# Patient Record
Sex: FEMALE | Race: WHITE | NOT HISPANIC OR LATINO | Employment: OTHER | URBAN - METROPOLITAN AREA
[De-identification: names, ages, dates, MRNs, and addresses within clinical notes are randomized per-mention and may not be internally consistent; named-entity substitution may affect disease eponyms.]

---

## 2017-01-04 ENCOUNTER — HOSPITAL ENCOUNTER (OUTPATIENT)
Dept: MAMMOGRAPHY | Facility: HOSPITAL | Age: 62
Discharge: HOME | End: 2017-01-04
Admitting: INTERNAL MEDICINE

## 2017-01-04 DIAGNOSIS — Z12.31 VISIT FOR SCREENING MAMMOGRAM: ICD-10-CM

## 2017-01-04 PROCEDURE — 77063 BREAST TOMOSYNTHESIS BI: CPT

## 2017-01-04 PROCEDURE — 77063 BREAST TOMOSYNTHESIS BI: CPT | Performed by: RADIOLOGY

## 2017-01-04 PROCEDURE — 77067 SCR MAMMO BI INCL CAD: CPT | Performed by: RADIOLOGY

## 2017-01-04 PROCEDURE — G0202 SCR MAMMO BI INCL CAD: HCPCS

## 2017-01-20 ENCOUNTER — HOSPITAL ENCOUNTER (OUTPATIENT)
Dept: MAMMOGRAPHY | Facility: HOSPITAL | Age: 62
Discharge: HOME | End: 2017-01-20
Admitting: INTERNAL MEDICINE

## 2017-01-20 DIAGNOSIS — R92.8 ABNORMAL MAMMOGRAM: ICD-10-CM

## 2017-01-20 PROCEDURE — G0279 TOMOSYNTHESIS, MAMMO: HCPCS

## 2017-01-20 PROCEDURE — G0206 DX MAMMO INCL CAD UNI: HCPCS

## 2017-01-20 PROCEDURE — 77061 BREAST TOMOSYNTHESIS UNI: CPT | Performed by: RADIOLOGY

## 2017-01-20 PROCEDURE — 77065 DX MAMMO INCL CAD UNI: CPT | Performed by: RADIOLOGY

## 2017-12-14 ENCOUNTER — TRANSCRIBE ORDERS (OUTPATIENT)
Dept: ADMINISTRATIVE | Facility: HOSPITAL | Age: 62
End: 2017-12-14

## 2017-12-14 DIAGNOSIS — Z12.31 VISIT FOR SCREENING MAMMOGRAM: Primary | ICD-10-CM

## 2018-01-17 ENCOUNTER — HOSPITAL ENCOUNTER (OUTPATIENT)
Dept: MAMMOGRAPHY | Facility: HOSPITAL | Age: 63
Discharge: HOME | End: 2018-01-17
Admitting: INTERNAL MEDICINE

## 2018-01-17 DIAGNOSIS — Z12.31 VISIT FOR SCREENING MAMMOGRAM: ICD-10-CM

## 2018-01-17 PROCEDURE — 77063 BREAST TOMOSYNTHESIS BI: CPT

## 2018-01-17 PROCEDURE — 77067 SCR MAMMO BI INCL CAD: CPT | Performed by: RADIOLOGY

## 2018-01-17 PROCEDURE — 77067 SCR MAMMO BI INCL CAD: CPT

## 2018-01-17 PROCEDURE — 77063 BREAST TOMOSYNTHESIS BI: CPT | Performed by: RADIOLOGY

## 2018-12-12 ENCOUNTER — TRANSCRIBE ORDERS (OUTPATIENT)
Dept: ADMINISTRATIVE | Facility: HOSPITAL | Age: 63
End: 2018-12-12

## 2018-12-12 DIAGNOSIS — Z12.31 VISIT FOR SCREENING MAMMOGRAM: Primary | ICD-10-CM

## 2019-02-04 ENCOUNTER — HOSPITAL ENCOUNTER (OUTPATIENT)
Dept: MAMMOGRAPHY | Facility: HOSPITAL | Age: 64
Discharge: HOME | End: 2019-02-04
Admitting: INTERNAL MEDICINE

## 2019-02-04 DIAGNOSIS — Z12.31 VISIT FOR SCREENING MAMMOGRAM: ICD-10-CM

## 2019-02-04 PROCEDURE — 77063 BREAST TOMOSYNTHESIS BI: CPT | Performed by: RADIOLOGY

## 2019-02-04 PROCEDURE — 77067 SCR MAMMO BI INCL CAD: CPT

## 2019-02-04 PROCEDURE — 77063 BREAST TOMOSYNTHESIS BI: CPT

## 2019-02-04 PROCEDURE — 77067 SCR MAMMO BI INCL CAD: CPT | Performed by: RADIOLOGY

## 2020-01-06 ENCOUNTER — TRANSCRIBE ORDERS (OUTPATIENT)
Dept: ADMINISTRATIVE | Facility: HOSPITAL | Age: 65
End: 2020-01-06

## 2020-01-06 DIAGNOSIS — Z12.31 VISIT FOR SCREENING MAMMOGRAM: Primary | ICD-10-CM

## 2020-03-04 ENCOUNTER — HOSPITAL ENCOUNTER (OUTPATIENT)
Dept: MAMMOGRAPHY | Facility: HOSPITAL | Age: 65
Discharge: HOME | End: 2020-03-04
Admitting: INTERNAL MEDICINE

## 2020-03-04 DIAGNOSIS — Z12.31 VISIT FOR SCREENING MAMMOGRAM: ICD-10-CM

## 2020-03-04 PROCEDURE — 77063 BREAST TOMOSYNTHESIS BI: CPT | Performed by: RADIOLOGY

## 2020-03-04 PROCEDURE — 77063 BREAST TOMOSYNTHESIS BI: CPT

## 2020-03-04 PROCEDURE — 77067 SCR MAMMO BI INCL CAD: CPT | Performed by: RADIOLOGY

## 2020-03-04 PROCEDURE — 77067 SCR MAMMO BI INCL CAD: CPT

## 2020-03-12 ENCOUNTER — HOSPITAL ENCOUNTER (OUTPATIENT)
Dept: MAMMOGRAPHY | Facility: HOSPITAL | Age: 65
Discharge: HOME | End: 2020-03-12

## 2020-03-12 DIAGNOSIS — Z13.9 VISIT FOR SCREENING: ICD-10-CM

## 2021-02-05 ENCOUNTER — TRANSCRIBE ORDERS (OUTPATIENT)
Dept: ADMINISTRATIVE | Facility: HOSPITAL | Age: 66
End: 2021-02-05

## 2021-02-05 DIAGNOSIS — Z12.31 VISIT FOR SCREENING MAMMOGRAM: Primary | ICD-10-CM

## 2021-03-19 ENCOUNTER — APPOINTMENT (OUTPATIENT)
Dept: MAMMOGRAPHY | Facility: HOSPITAL | Age: 66
End: 2021-03-19

## 2021-04-30 ENCOUNTER — APPOINTMENT (OUTPATIENT)
Dept: MAMMOGRAPHY | Facility: HOSPITAL | Age: 66
End: 2021-04-30

## 2022-01-20 ENCOUNTER — TRANSCRIBE ORDERS (OUTPATIENT)
Dept: ADMINISTRATIVE | Facility: HOSPITAL | Age: 67
End: 2022-01-20

## 2022-01-20 DIAGNOSIS — Z12.31 VISIT FOR SCREENING MAMMOGRAM: Primary | ICD-10-CM

## 2022-03-07 ENCOUNTER — HOSPITAL ENCOUNTER (OUTPATIENT)
Dept: MAMMOGRAPHY | Facility: HOSPITAL | Age: 67
Discharge: HOME | End: 2022-03-07
Admitting: INTERNAL MEDICINE

## 2022-03-07 DIAGNOSIS — Z12.31 VISIT FOR SCREENING MAMMOGRAM: ICD-10-CM

## 2022-03-07 PROCEDURE — 77063 BREAST TOMOSYNTHESIS BI: CPT | Performed by: RADIOLOGY

## 2022-03-07 PROCEDURE — 77067 SCR MAMMO BI INCL CAD: CPT

## 2022-03-07 PROCEDURE — 77067 SCR MAMMO BI INCL CAD: CPT | Performed by: RADIOLOGY

## 2022-03-07 PROCEDURE — 77063 BREAST TOMOSYNTHESIS BI: CPT

## 2023-01-30 ENCOUNTER — TRANSCRIBE ORDERS (OUTPATIENT)
Dept: ADMINISTRATIVE | Facility: HOSPITAL | Age: 68
End: 2023-01-30
Payer: MEDICARE

## 2023-01-30 DIAGNOSIS — Z12.31 VISIT FOR SCREENING MAMMOGRAM: Primary | ICD-10-CM

## 2023-03-14 ENCOUNTER — HOSPITAL ENCOUNTER (OUTPATIENT)
Dept: MAMMOGRAPHY | Facility: HOSPITAL | Age: 68
Discharge: HOME | End: 2023-03-14
Admitting: INTERNAL MEDICINE
Payer: MEDICARE

## 2023-03-14 DIAGNOSIS — Z12.31 VISIT FOR SCREENING MAMMOGRAM: ICD-10-CM

## 2023-03-14 PROCEDURE — 77063 BREAST TOMOSYNTHESIS BI: CPT | Performed by: RADIOLOGY

## 2023-03-14 PROCEDURE — 77063 BREAST TOMOSYNTHESIS BI: CPT

## 2023-03-14 PROCEDURE — 77067 SCR MAMMO BI INCL CAD: CPT

## 2023-03-14 PROCEDURE — 77067 SCR MAMMO BI INCL CAD: CPT | Performed by: RADIOLOGY

## 2024-02-14 ENCOUNTER — TRANSCRIBE ORDERS (OUTPATIENT)
Dept: ADMINISTRATIVE | Facility: HOSPITAL | Age: 69
End: 2024-02-14
Payer: MEDICARE

## 2024-02-14 DIAGNOSIS — Z12.31 BREAST CANCER SCREENING BY MAMMOGRAM: Primary | ICD-10-CM

## 2024-03-21 ENCOUNTER — HOSPITAL ENCOUNTER (OUTPATIENT)
Dept: MAMMOGRAPHY | Facility: HOSPITAL | Age: 69
Discharge: HOME | End: 2024-03-21
Admitting: INTERNAL MEDICINE
Payer: MEDICARE

## 2024-03-21 DIAGNOSIS — Z12.31 BREAST CANCER SCREENING BY MAMMOGRAM: ICD-10-CM

## 2024-03-21 PROCEDURE — 77063 BREAST TOMOSYNTHESIS BI: CPT

## 2024-03-21 PROCEDURE — 77067 SCR MAMMO BI INCL CAD: CPT

## 2024-07-30 ENCOUNTER — HOSPITAL ENCOUNTER (INPATIENT)
Facility: HOSPITAL | Age: 69
LOS: 3 days | Discharge: HOME | End: 2024-08-03
Attending: FAMILY MEDICINE | Admitting: INTERNAL MEDICINE
Payer: MEDICARE

## 2024-07-30 DIAGNOSIS — N94.89 ADNEXAL MASS: ICD-10-CM

## 2024-07-30 DIAGNOSIS — R19.00 PELVIC MASS IN FEMALE: Primary | ICD-10-CM

## 2024-07-30 PROBLEM — E78.2 MIXED HYPERLIPIDEMIA: Status: ACTIVE | Noted: 2024-07-30

## 2024-07-30 PROBLEM — N20.0 LEFT RENAL STONE: Status: ACTIVE | Noted: 2024-07-30

## 2024-07-30 PROBLEM — I10 ESSENTIAL HYPERTENSION: Status: ACTIVE | Noted: 2024-07-30

## 2024-07-30 LAB
ALBUMIN SERPL-MCNC: 3.7 G/DL (ref 3.5–5.2)
ALBUMIN/GLOB SERPL: 1.1 G/DL
ALP SERPL-CCNC: 100 U/L (ref 39–117)
ALT SERPL W P-5'-P-CCNC: 21 U/L (ref 1–33)
ANION GAP SERPL CALCULATED.3IONS-SCNC: 11 MMOL/L (ref 5–15)
AST SERPL-CCNC: 29 U/L (ref 1–32)
BASOPHILS # BLD AUTO: 0.04 10*3/MM3 (ref 0–0.2)
BASOPHILS NFR BLD AUTO: 0.3 % (ref 0–1.5)
BILIRUB SERPL-MCNC: 0.8 MG/DL (ref 0–1.2)
BUN SERPL-MCNC: 8 MG/DL (ref 8–23)
BUN/CREAT SERPL: 9.4 (ref 7–25)
CALCIUM SPEC-SCNC: 9.1 MG/DL (ref 8.6–10.5)
CHLORIDE SERPL-SCNC: 105 MMOL/L (ref 98–107)
CO2 SERPL-SCNC: 24 MMOL/L (ref 22–29)
CREAT SERPL-MCNC: 0.85 MG/DL (ref 0.57–1)
DEPRECATED RDW RBC AUTO: 43.7 FL (ref 37–54)
EGFRCR SERPLBLD CKD-EPI 2021: 73.8 ML/MIN/1.73
EOSINOPHIL # BLD AUTO: 0.03 10*3/MM3 (ref 0–0.4)
EOSINOPHIL NFR BLD AUTO: 0.2 % (ref 0.3–6.2)
ERYTHROCYTE [DISTWIDTH] IN BLOOD BY AUTOMATED COUNT: 14.2 % (ref 12.3–15.4)
GLOBULIN UR ELPH-MCNC: 3.5 GM/DL
GLUCOSE SERPL-MCNC: 100 MG/DL (ref 65–99)
HCT VFR BLD AUTO: 39.6 % (ref 34–46.6)
HGB BLD-MCNC: 12.8 G/DL (ref 12–15.9)
IMM GRANULOCYTES # BLD AUTO: 0.03 10*3/MM3 (ref 0–0.05)
IMM GRANULOCYTES NFR BLD AUTO: 0.2 % (ref 0–0.5)
LYMPHOCYTES # BLD AUTO: 2.24 10*3/MM3 (ref 0.7–3.1)
LYMPHOCYTES NFR BLD AUTO: 15.7 % (ref 19.6–45.3)
MCH RBC QN AUTO: 27.4 PG (ref 26.6–33)
MCHC RBC AUTO-ENTMCNC: 32.3 G/DL (ref 31.5–35.7)
MCV RBC AUTO: 84.8 FL (ref 79–97)
MONOCYTES # BLD AUTO: 1.19 10*3/MM3 (ref 0.1–0.9)
MONOCYTES NFR BLD AUTO: 8.4 % (ref 5–12)
NEUTROPHILS NFR BLD AUTO: 10.7 10*3/MM3 (ref 1.7–7)
NEUTROPHILS NFR BLD AUTO: 75.2 % (ref 42.7–76)
NRBC BLD AUTO-RTO: 0 /100 WBC (ref 0–0.2)
PLATELET # BLD AUTO: 269 10*3/MM3 (ref 140–450)
PMV BLD AUTO: 10.7 FL (ref 6–12)
POTASSIUM SERPL-SCNC: 4.4 MMOL/L (ref 3.5–5.2)
PROT SERPL-MCNC: 7.2 G/DL (ref 6–8.5)
RBC # BLD AUTO: 4.67 10*6/MM3 (ref 3.77–5.28)
SODIUM SERPL-SCNC: 140 MMOL/L (ref 136–145)
WBC NRBC COR # BLD AUTO: 14.23 10*3/MM3 (ref 3.4–10.8)

## 2024-07-30 PROCEDURE — G0378 HOSPITAL OBSERVATION PER HR: HCPCS

## 2024-07-30 PROCEDURE — 93005 ELECTROCARDIOGRAM TRACING: CPT | Performed by: PHYSICIAN ASSISTANT

## 2024-07-30 PROCEDURE — 25810000003 SODIUM CHLORIDE 0.9 % SOLUTION: Performed by: PHYSICIAN ASSISTANT

## 2024-07-30 PROCEDURE — 80053 COMPREHEN METABOLIC PANEL: CPT | Performed by: PHYSICIAN ASSISTANT

## 2024-07-30 PROCEDURE — 85025 COMPLETE CBC W/AUTO DIFF WBC: CPT | Performed by: PHYSICIAN ASSISTANT

## 2024-07-30 PROCEDURE — 99222 1ST HOSP IP/OBS MODERATE 55: CPT | Performed by: FAMILY MEDICINE

## 2024-07-30 PROCEDURE — 93010 ELECTROCARDIOGRAM REPORT: CPT | Performed by: INTERNAL MEDICINE

## 2024-07-30 RX ORDER — VITAMIN A, ASCORBIC ACID, CHOLECALCIFEROL, ALPHA-TOCOPHEROL ACETATE, THIAMINE HYDROCHLORIDE, RIBOFLAVIN 5-PHOSPHATE SODIUM, NIACINAMIDE, PYRIDOXINE HYDROCHLORIDE, FERROUS SULFATE AND SODIUM FLUORIDE 1500; 35; 400; 5; .5; .6; 8; .4; 10; .25 [IU]/ML; MG/ML; [IU]/ML; [IU]/ML; MG/ML; MG/ML; MG/ML; MG/ML; MG/ML; MG/ML
1 LIQUID ORAL DAILY
COMMUNITY

## 2024-07-30 RX ORDER — SODIUM CHLORIDE 0.9 % (FLUSH) 0.9 %
10 SYRINGE (ML) INJECTION AS NEEDED
Status: DISCONTINUED | OUTPATIENT
Start: 2024-07-30 | End: 2024-08-03 | Stop reason: HOSPADM

## 2024-07-30 RX ORDER — SODIUM CHLORIDE 0.9 % (FLUSH) 0.9 %
10 SYRINGE (ML) INJECTION EVERY 12 HOURS SCHEDULED
Status: DISCONTINUED | OUTPATIENT
Start: 2024-07-30 | End: 2024-08-03 | Stop reason: HOSPADM

## 2024-07-30 RX ORDER — ACETAMINOPHEN 650 MG/1
650 SUPPOSITORY RECTAL EVERY 4 HOURS PRN
Status: DISCONTINUED | OUTPATIENT
Start: 2024-07-30 | End: 2024-08-03 | Stop reason: HOSPADM

## 2024-07-30 RX ORDER — ROSUVASTATIN CALCIUM 20 MG/1
20 TABLET, COATED ORAL DAILY
Status: DISCONTINUED | OUTPATIENT
Start: 2024-07-31 | End: 2024-07-31

## 2024-07-30 RX ORDER — MORPHINE SULFATE 2 MG/ML
2 INJECTION, SOLUTION INTRAMUSCULAR; INTRAVENOUS EVERY 4 HOURS PRN
Status: DISCONTINUED | OUTPATIENT
Start: 2024-07-30 | End: 2024-07-31

## 2024-07-30 RX ORDER — ASPIRIN 81 MG/1
81 TABLET, CHEWABLE ORAL DAILY
COMMUNITY

## 2024-07-30 RX ORDER — ONDANSETRON 4 MG/1
4 TABLET, ORALLY DISINTEGRATING ORAL EVERY 6 HOURS PRN
Status: DISCONTINUED | OUTPATIENT
Start: 2024-07-30 | End: 2024-07-31

## 2024-07-30 RX ORDER — ACETAMINOPHEN 325 MG/1
650 TABLET ORAL EVERY 4 HOURS PRN
Status: DISCONTINUED | OUTPATIENT
Start: 2024-07-30 | End: 2024-08-03 | Stop reason: HOSPADM

## 2024-07-30 RX ORDER — ERGOCALCIFEROL (VITAMIN D2) 10 MCG
500 TABLET ORAL DAILY
COMMUNITY

## 2024-07-30 RX ORDER — LISINOPRIL 10 MG/1
10 TABLET ORAL DAILY
COMMUNITY

## 2024-07-30 RX ORDER — ONDANSETRON 2 MG/ML
4 INJECTION INTRAMUSCULAR; INTRAVENOUS EVERY 6 HOURS PRN
Status: DISCONTINUED | OUTPATIENT
Start: 2024-07-30 | End: 2024-07-31

## 2024-07-30 RX ORDER — NITROGLYCERIN 0.4 MG/1
0.4 TABLET SUBLINGUAL
Status: DISCONTINUED | OUTPATIENT
Start: 2024-07-30 | End: 2024-08-03 | Stop reason: HOSPADM

## 2024-07-30 RX ORDER — ROSUVASTATIN CALCIUM 20 MG/1
20 TABLET, COATED ORAL DAILY
COMMUNITY

## 2024-07-30 RX ORDER — LISINOPRIL 10 MG/1
10 TABLET ORAL DAILY
Status: DISCONTINUED | OUTPATIENT
Start: 2024-07-31 | End: 2024-08-03 | Stop reason: HOSPADM

## 2024-07-30 RX ORDER — SODIUM CHLORIDE 9 MG/ML
75 INJECTION, SOLUTION INTRAVENOUS CONTINUOUS
Status: DISCONTINUED | OUTPATIENT
Start: 2024-07-30 | End: 2024-08-03 | Stop reason: HOSPADM

## 2024-07-30 RX ORDER — SODIUM CHLORIDE 9 MG/ML
40 INJECTION, SOLUTION INTRAVENOUS AS NEEDED
Status: DISCONTINUED | OUTPATIENT
Start: 2024-07-30 | End: 2024-08-03 | Stop reason: HOSPADM

## 2024-07-30 RX ORDER — ACETAMINOPHEN 160 MG/5ML
650 SOLUTION ORAL EVERY 4 HOURS PRN
Status: DISCONTINUED | OUTPATIENT
Start: 2024-07-30 | End: 2024-08-03 | Stop reason: HOSPADM

## 2024-07-30 RX ORDER — ASPIRIN 81 MG/1
81 TABLET, CHEWABLE ORAL DAILY
Status: DISCONTINUED | OUTPATIENT
Start: 2024-07-31 | End: 2024-08-03 | Stop reason: HOSPADM

## 2024-07-30 RX ADMIN — SODIUM CHLORIDE 75 ML/HR: 9 INJECTION, SOLUTION INTRAVENOUS at 23:05

## 2024-07-31 ENCOUNTER — ANESTHESIA (OUTPATIENT)
Dept: PERIOP | Facility: HOSPITAL | Age: 69
End: 2024-07-31
Payer: MEDICARE

## 2024-07-31 ENCOUNTER — ANESTHESIA EVENT (OUTPATIENT)
Dept: PERIOP | Facility: HOSPITAL | Age: 69
End: 2024-07-31
Payer: MEDICARE

## 2024-07-31 ENCOUNTER — ANESTHESIA EVENT CONVERTED (OUTPATIENT)
Dept: ANESTHESIOLOGY | Facility: HOSPITAL | Age: 69
End: 2024-07-31
Payer: MEDICARE

## 2024-07-31 PROBLEM — R19.00 PELVIC MASS IN FEMALE: Status: ACTIVE | Noted: 2024-07-30

## 2024-07-31 LAB
ABO GROUP BLD: NORMAL
ABO GROUP BLD: NORMAL
ANION GAP SERPL CALCULATED.3IONS-SCNC: 9 MMOL/L (ref 5–15)
BASOPHILS # BLD AUTO: 0.05 10*3/MM3 (ref 0–0.2)
BASOPHILS NFR BLD AUTO: 0.4 % (ref 0–1.5)
BLD GP AB SCN SERPL QL: NEGATIVE
BUN SERPL-MCNC: 10 MG/DL (ref 8–23)
BUN/CREAT SERPL: 12.5 (ref 7–25)
CALCIUM SPEC-SCNC: 8.3 MG/DL (ref 8.6–10.5)
CHLORIDE SERPL-SCNC: 105 MMOL/L (ref 98–107)
CO2 SERPL-SCNC: 24 MMOL/L (ref 22–29)
CREAT SERPL-MCNC: 0.8 MG/DL (ref 0.57–1)
DEPRECATED RDW RBC AUTO: 44 FL (ref 37–54)
EGFRCR SERPLBLD CKD-EPI 2021: 79.4 ML/MIN/1.73
EOSINOPHIL # BLD AUTO: 0.06 10*3/MM3 (ref 0–0.4)
EOSINOPHIL NFR BLD AUTO: 0.5 % (ref 0.3–6.2)
ERYTHROCYTE [DISTWIDTH] IN BLOOD BY AUTOMATED COUNT: 14.2 % (ref 12.3–15.4)
GLUCOSE SERPL-MCNC: 135 MG/DL (ref 65–99)
HCT VFR BLD AUTO: 36 % (ref 34–46.6)
HGB BLD-MCNC: 11.5 G/DL (ref 12–15.9)
IMM GRANULOCYTES # BLD AUTO: 0.04 10*3/MM3 (ref 0–0.05)
IMM GRANULOCYTES NFR BLD AUTO: 0.3 % (ref 0–0.5)
LYMPHOCYTES # BLD AUTO: 1.35 10*3/MM3 (ref 0.7–3.1)
LYMPHOCYTES NFR BLD AUTO: 11.6 % (ref 19.6–45.3)
MCH RBC QN AUTO: 27.1 PG (ref 26.6–33)
MCHC RBC AUTO-ENTMCNC: 31.9 G/DL (ref 31.5–35.7)
MCV RBC AUTO: 84.9 FL (ref 79–97)
MONOCYTES # BLD AUTO: 1.01 10*3/MM3 (ref 0.1–0.9)
MONOCYTES NFR BLD AUTO: 8.7 % (ref 5–12)
NEUTROPHILS NFR BLD AUTO: 78.5 % (ref 42.7–76)
NEUTROPHILS NFR BLD AUTO: 9.15 10*3/MM3 (ref 1.7–7)
NRBC BLD AUTO-RTO: 0 /100 WBC (ref 0–0.2)
PLATELET # BLD AUTO: 254 10*3/MM3 (ref 140–450)
PMV BLD AUTO: 10.9 FL (ref 6–12)
POTASSIUM SERPL-SCNC: 4.1 MMOL/L (ref 3.5–5.2)
QT INTERVAL: 370 MS
QTC INTERVAL: 407 MS
RBC # BLD AUTO: 4.24 10*6/MM3 (ref 3.77–5.28)
RH BLD: POSITIVE
RH BLD: POSITIVE
SODIUM SERPL-SCNC: 138 MMOL/L (ref 136–145)
T&S EXPIRATION DATE: NORMAL
WBC NRBC COR # BLD AUTO: 11.66 10*3/MM3 (ref 3.4–10.8)

## 2024-07-31 PROCEDURE — 25010000002 MORPHINE PER 10 MG: Performed by: FAMILY MEDICINE

## 2024-07-31 PROCEDURE — 0UT90ZZ RESECTION OF UTERUS, OPEN APPROACH: ICD-10-PCS | Performed by: OBSTETRICS & GYNECOLOGY

## 2024-07-31 PROCEDURE — 86900 BLOOD TYPING SEROLOGIC ABO: CPT | Performed by: OBSTETRICS & GYNECOLOGY

## 2024-07-31 PROCEDURE — 85025 COMPLETE CBC W/AUTO DIFF WBC: CPT | Performed by: PHYSICIAN ASSISTANT

## 2024-07-31 PROCEDURE — 88307 TISSUE EXAM BY PATHOLOGIST: CPT | Performed by: OBSTETRICS & GYNECOLOGY

## 2024-07-31 PROCEDURE — 0UT70ZZ RESECTION OF BILATERAL FALLOPIAN TUBES, OPEN APPROACH: ICD-10-PCS | Performed by: OBSTETRICS & GYNECOLOGY

## 2024-07-31 PROCEDURE — 25010000002 CEFAZOLIN PER 500 MG: Performed by: OBSTETRICS & GYNECOLOGY

## 2024-07-31 PROCEDURE — 25810000003 LACTATED RINGERS PER 1000 ML: Performed by: OBSTETRICS & GYNECOLOGY

## 2024-07-31 PROCEDURE — 0UT20ZZ RESECTION OF BILATERAL OVARIES, OPEN APPROACH: ICD-10-PCS | Performed by: OBSTETRICS & GYNECOLOGY

## 2024-07-31 PROCEDURE — 25010000002 DEXAMETHASONE SODIUM PHOSPHATE 10 MG/ML SOLUTION: Performed by: NURSE ANESTHETIST, CERTIFIED REGISTERED

## 2024-07-31 PROCEDURE — 25010000002 ONDANSETRON PER 1 MG: Performed by: NURSE ANESTHETIST, CERTIFIED REGISTERED

## 2024-07-31 PROCEDURE — 25010000002 HYDROMORPHONE PER 4 MG: Performed by: NURSE ANESTHETIST, CERTIFIED REGISTERED

## 2024-07-31 PROCEDURE — 99232 SBSQ HOSP IP/OBS MODERATE 35: CPT | Performed by: STUDENT IN AN ORGANIZED HEALTH CARE EDUCATION/TRAINING PROGRAM

## 2024-07-31 PROCEDURE — 86901 BLOOD TYPING SEROLOGIC RH(D): CPT

## 2024-07-31 PROCEDURE — 86900 BLOOD TYPING SEROLOGIC ABO: CPT

## 2024-07-31 PROCEDURE — 25010000002 SUGAMMADEX 200 MG/2ML SOLUTION: Performed by: NURSE ANESTHETIST, CERTIFIED REGISTERED

## 2024-07-31 PROCEDURE — 86901 BLOOD TYPING SEROLOGIC RH(D): CPT | Performed by: OBSTETRICS & GYNECOLOGY

## 2024-07-31 PROCEDURE — 25010000002 BUPIVACAINE (PF) 0.25 % SOLUTION: Performed by: NURSE ANESTHETIST, CERTIFIED REGISTERED

## 2024-07-31 PROCEDURE — 25810000003 LACTATED RINGERS PER 1000 ML: Performed by: ANESTHESIOLOGY

## 2024-07-31 PROCEDURE — 25010000002 PROPOFOL 10 MG/ML EMULSION: Performed by: NURSE ANESTHETIST, CERTIFIED REGISTERED

## 2024-07-31 PROCEDURE — 86850 RBC ANTIBODY SCREEN: CPT | Performed by: OBSTETRICS & GYNECOLOGY

## 2024-07-31 PROCEDURE — 80048 BASIC METABOLIC PNL TOTAL CA: CPT | Performed by: PHYSICIAN ASSISTANT

## 2024-07-31 RX ORDER — ONDANSETRON 4 MG/1
4 TABLET, ORALLY DISINTEGRATING ORAL EVERY 6 HOURS PRN
Status: DISCONTINUED | OUTPATIENT
Start: 2024-07-31 | End: 2024-08-03 | Stop reason: HOSPADM

## 2024-07-31 RX ORDER — BUPIVACAINE HYDROCHLORIDE 2.5 MG/ML
INJECTION, SOLUTION EPIDURAL; INFILTRATION; INTRACAUDAL
Status: COMPLETED | OUTPATIENT
Start: 2024-07-31 | End: 2024-07-31

## 2024-07-31 RX ORDER — PROMETHAZINE HYDROCHLORIDE 12.5 MG/1
12.5 SUPPOSITORY RECTAL EVERY 6 HOURS PRN
Status: DISCONTINUED | OUTPATIENT
Start: 2024-07-31 | End: 2024-08-03 | Stop reason: HOSPADM

## 2024-07-31 RX ORDER — MIDAZOLAM HYDROCHLORIDE 1 MG/ML
0.5 INJECTION INTRAMUSCULAR; INTRAVENOUS
Status: DISCONTINUED | OUTPATIENT
Start: 2024-07-31 | End: 2024-07-31 | Stop reason: HOSPADM

## 2024-07-31 RX ORDER — DROPERIDOL 2.5 MG/ML
0.62 INJECTION, SOLUTION INTRAMUSCULAR; INTRAVENOUS ONCE AS NEEDED
Status: DISCONTINUED | OUTPATIENT
Start: 2024-07-31 | End: 2024-07-31 | Stop reason: HOSPADM

## 2024-07-31 RX ORDER — OXYCODONE HYDROCHLORIDE 10 MG/1
10 TABLET ORAL EVERY 4 HOURS PRN
Status: DISCONTINUED | OUTPATIENT
Start: 2024-07-31 | End: 2024-08-03 | Stop reason: HOSPADM

## 2024-07-31 RX ORDER — NALOXONE HCL 0.4 MG/ML
0.4 VIAL (ML) INJECTION
Status: DISCONTINUED | OUTPATIENT
Start: 2024-07-31 | End: 2024-08-03 | Stop reason: HOSPADM

## 2024-07-31 RX ORDER — SODIUM CHLORIDE, SODIUM LACTATE, POTASSIUM CHLORIDE, CALCIUM CHLORIDE 600; 310; 30; 20 MG/100ML; MG/100ML; MG/100ML; MG/100ML
75 INJECTION, SOLUTION INTRAVENOUS CONTINUOUS
Status: DISCONTINUED | OUTPATIENT
Start: 2024-07-31 | End: 2024-08-03 | Stop reason: HOSPADM

## 2024-07-31 RX ORDER — FAMOTIDINE 10 MG/ML
20 INJECTION, SOLUTION INTRAVENOUS ONCE
Status: CANCELLED | OUTPATIENT
Start: 2024-07-31 | End: 2024-07-31

## 2024-07-31 RX ORDER — SODIUM CHLORIDE 0.9 % (FLUSH) 0.9 %
3 SYRINGE (ML) INJECTION EVERY 12 HOURS SCHEDULED
Status: DISCONTINUED | OUTPATIENT
Start: 2024-07-31 | End: 2024-07-31 | Stop reason: HOSPADM

## 2024-07-31 RX ORDER — ONDANSETRON 2 MG/ML
4 INJECTION INTRAMUSCULAR; INTRAVENOUS ONCE AS NEEDED
Status: DISCONTINUED | OUTPATIENT
Start: 2024-07-31 | End: 2024-07-31 | Stop reason: HOSPADM

## 2024-07-31 RX ORDER — ONDANSETRON 2 MG/ML
INJECTION INTRAMUSCULAR; INTRAVENOUS AS NEEDED
Status: DISCONTINUED | OUTPATIENT
Start: 2024-07-31 | End: 2024-07-31 | Stop reason: SURG

## 2024-07-31 RX ORDER — SODIUM CHLORIDE 9 MG/ML
40 INJECTION, SOLUTION INTRAVENOUS AS NEEDED
Status: CANCELLED | OUTPATIENT
Start: 2024-07-31

## 2024-07-31 RX ORDER — PROPOFOL 10 MG/ML
VIAL (ML) INTRAVENOUS AS NEEDED
Status: DISCONTINUED | OUTPATIENT
Start: 2024-07-31 | End: 2024-07-31 | Stop reason: SURG

## 2024-07-31 RX ORDER — FAMOTIDINE 20 MG/1
20 TABLET, FILM COATED ORAL ONCE
Status: DISCONTINUED | OUTPATIENT
Start: 2024-07-31 | End: 2024-08-03 | Stop reason: HOSPADM

## 2024-07-31 RX ORDER — HYDROMORPHONE HYDROCHLORIDE 2 MG/ML
INJECTION, SOLUTION INTRAMUSCULAR; INTRAVENOUS; SUBCUTANEOUS AS NEEDED
Status: DISCONTINUED | OUTPATIENT
Start: 2024-07-31 | End: 2024-07-31 | Stop reason: SURG

## 2024-07-31 RX ORDER — IPRATROPIUM BROMIDE AND ALBUTEROL SULFATE 2.5; .5 MG/3ML; MG/3ML
3 SOLUTION RESPIRATORY (INHALATION) ONCE AS NEEDED
Status: DISCONTINUED | OUTPATIENT
Start: 2024-07-31 | End: 2024-07-31 | Stop reason: HOSPADM

## 2024-07-31 RX ORDER — ROCURONIUM BROMIDE 10 MG/ML
INJECTION, SOLUTION INTRAVENOUS AS NEEDED
Status: DISCONTINUED | OUTPATIENT
Start: 2024-07-31 | End: 2024-07-31 | Stop reason: SURG

## 2024-07-31 RX ORDER — PROMETHAZINE HYDROCHLORIDE 25 MG/1
25 SUPPOSITORY RECTAL ONCE AS NEEDED
Status: DISCONTINUED | OUTPATIENT
Start: 2024-07-31 | End: 2024-07-31 | Stop reason: HOSPADM

## 2024-07-31 RX ORDER — ACETAMINOPHEN 325 MG/1
650 TABLET ORAL EVERY 6 HOURS SCHEDULED
Status: DISCONTINUED | OUTPATIENT
Start: 2024-07-31 | End: 2024-08-03 | Stop reason: HOSPADM

## 2024-07-31 RX ORDER — OXYCODONE HYDROCHLORIDE 5 MG/1
5 TABLET ORAL EVERY 4 HOURS PRN
Status: DISCONTINUED | OUTPATIENT
Start: 2024-07-31 | End: 2024-08-03 | Stop reason: HOSPADM

## 2024-07-31 RX ORDER — SODIUM CHLORIDE 0.9 % (FLUSH) 0.9 %
3-10 SYRINGE (ML) INJECTION AS NEEDED
Status: DISCONTINUED | OUTPATIENT
Start: 2024-07-31 | End: 2024-07-31 | Stop reason: HOSPADM

## 2024-07-31 RX ORDER — SODIUM CHLORIDE 0.9 % (FLUSH) 0.9 %
10 SYRINGE (ML) INJECTION EVERY 12 HOURS SCHEDULED
Status: DISCONTINUED | OUTPATIENT
Start: 2024-07-31 | End: 2024-07-31 | Stop reason: HOSPADM

## 2024-07-31 RX ORDER — FENTANYL CITRATE 50 UG/ML
50 INJECTION, SOLUTION INTRAMUSCULAR; INTRAVENOUS
Status: DISCONTINUED | OUTPATIENT
Start: 2024-07-31 | End: 2024-07-31 | Stop reason: HOSPADM

## 2024-07-31 RX ORDER — LIDOCAINE HYDROCHLORIDE 10 MG/ML
0.5 INJECTION, SOLUTION EPIDURAL; INFILTRATION; INTRACAUDAL; PERINEURAL ONCE AS NEEDED
Status: DISCONTINUED | OUTPATIENT
Start: 2024-07-31 | End: 2024-07-31 | Stop reason: HOSPADM

## 2024-07-31 RX ORDER — PROMETHAZINE HYDROCHLORIDE 12.5 MG/1
12.5 TABLET ORAL EVERY 6 HOURS PRN
Status: DISCONTINUED | OUTPATIENT
Start: 2024-07-31 | End: 2024-08-03 | Stop reason: HOSPADM

## 2024-07-31 RX ORDER — DEXAMETHASONE SODIUM PHOSPHATE 10 MG/ML
INJECTION, SOLUTION INTRAMUSCULAR; INTRAVENOUS
Status: COMPLETED | OUTPATIENT
Start: 2024-07-31 | End: 2024-07-31

## 2024-07-31 RX ORDER — HYDROMORPHONE HYDROCHLORIDE 2 MG/ML
0.5 INJECTION, SOLUTION INTRAMUSCULAR; INTRAVENOUS; SUBCUTANEOUS
Status: DISCONTINUED | OUTPATIENT
Start: 2024-07-31 | End: 2024-08-03 | Stop reason: HOSPADM

## 2024-07-31 RX ORDER — SODIUM CHLORIDE 9 MG/ML
40 INJECTION, SOLUTION INTRAVENOUS AS NEEDED
Status: DISCONTINUED | OUTPATIENT
Start: 2024-07-31 | End: 2024-07-31 | Stop reason: HOSPADM

## 2024-07-31 RX ORDER — SODIUM CHLORIDE 0.9 % (FLUSH) 0.9 %
10 SYRINGE (ML) INJECTION AS NEEDED
Status: CANCELLED | OUTPATIENT
Start: 2024-07-31

## 2024-07-31 RX ORDER — HYDROMORPHONE HYDROCHLORIDE 1 MG/ML
0.5 INJECTION, SOLUTION INTRAMUSCULAR; INTRAVENOUS; SUBCUTANEOUS
Status: DISCONTINUED | OUTPATIENT
Start: 2024-07-31 | End: 2024-07-31 | Stop reason: HOSPADM

## 2024-07-31 RX ORDER — PROMETHAZINE HYDROCHLORIDE 25 MG/1
25 TABLET ORAL ONCE AS NEEDED
Status: DISCONTINUED | OUTPATIENT
Start: 2024-07-31 | End: 2024-07-31 | Stop reason: HOSPADM

## 2024-07-31 RX ORDER — LIDOCAINE HYDROCHLORIDE 10 MG/ML
INJECTION, SOLUTION EPIDURAL; INFILTRATION; INTRACAUDAL; PERINEURAL AS NEEDED
Status: DISCONTINUED | OUTPATIENT
Start: 2024-07-31 | End: 2024-07-31 | Stop reason: SURG

## 2024-07-31 RX ORDER — IBUPROFEN 600 MG/1
600 TABLET ORAL EVERY 6 HOURS PRN
Status: DISCONTINUED | OUTPATIENT
Start: 2024-07-31 | End: 2024-08-03 | Stop reason: HOSPADM

## 2024-07-31 RX ORDER — HYDRALAZINE HYDROCHLORIDE 20 MG/ML
5 INJECTION INTRAMUSCULAR; INTRAVENOUS
Status: DISCONTINUED | OUTPATIENT
Start: 2024-07-31 | End: 2024-07-31 | Stop reason: HOSPADM

## 2024-07-31 RX ORDER — DROPERIDOL 2.5 MG/ML
0.62 INJECTION, SOLUTION INTRAMUSCULAR; INTRAVENOUS
Status: DISCONTINUED | OUTPATIENT
Start: 2024-07-31 | End: 2024-07-31 | Stop reason: HOSPADM

## 2024-07-31 RX ORDER — CEFAZOLIN SODIUM 1 G/3ML
2 INJECTION, POWDER, FOR SOLUTION INTRAMUSCULAR; INTRAVENOUS
Status: DISCONTINUED | OUTPATIENT
Start: 2024-08-01 | End: 2024-07-31

## 2024-07-31 RX ORDER — MAGNESIUM HYDROXIDE 1200 MG/15ML
LIQUID ORAL AS NEEDED
Status: DISCONTINUED | OUTPATIENT
Start: 2024-07-31 | End: 2024-07-31 | Stop reason: HOSPADM

## 2024-07-31 RX ORDER — NALOXONE HCL 0.4 MG/ML
0.1 VIAL (ML) INJECTION
Status: DISCONTINUED | OUTPATIENT
Start: 2024-07-31 | End: 2024-07-31

## 2024-07-31 RX ORDER — NALOXONE HCL 0.4 MG/ML
0.4 VIAL (ML) INJECTION AS NEEDED
Status: DISCONTINUED | OUTPATIENT
Start: 2024-07-31 | End: 2024-07-31 | Stop reason: HOSPADM

## 2024-07-31 RX ORDER — ONDANSETRON 2 MG/ML
4 INJECTION INTRAMUSCULAR; INTRAVENOUS EVERY 6 HOURS PRN
Status: DISCONTINUED | OUTPATIENT
Start: 2024-07-31 | End: 2024-08-03 | Stop reason: HOSPADM

## 2024-07-31 RX ORDER — MEPERIDINE HYDROCHLORIDE 25 MG/ML
12.5 INJECTION INTRAMUSCULAR; INTRAVENOUS; SUBCUTANEOUS
Status: DISCONTINUED | OUTPATIENT
Start: 2024-07-31 | End: 2024-07-31 | Stop reason: HOSPADM

## 2024-07-31 RX ORDER — ROSUVASTATIN CALCIUM 20 MG/1
20 TABLET, COATED ORAL NIGHTLY
Status: DISCONTINUED | OUTPATIENT
Start: 2024-08-01 | End: 2024-08-03 | Stop reason: HOSPADM

## 2024-07-31 RX ORDER — FAMOTIDINE 20 MG/1
20 TABLET, FILM COATED ORAL ONCE
Status: COMPLETED | OUTPATIENT
Start: 2024-07-31 | End: 2024-07-31

## 2024-07-31 RX ORDER — HYDROCODONE BITARTRATE AND ACETAMINOPHEN 5; 325 MG/1; MG/1
1 TABLET ORAL ONCE AS NEEDED
Status: DISCONTINUED | OUTPATIENT
Start: 2024-07-31 | End: 2024-07-31 | Stop reason: HOSPADM

## 2024-07-31 RX ORDER — SODIUM CHLORIDE, SODIUM LACTATE, POTASSIUM CHLORIDE, CALCIUM CHLORIDE 600; 310; 30; 20 MG/100ML; MG/100ML; MG/100ML; MG/100ML
9 INJECTION, SOLUTION INTRAVENOUS CONTINUOUS
Status: DISCONTINUED | OUTPATIENT
Start: 2024-07-31 | End: 2024-08-03 | Stop reason: HOSPADM

## 2024-07-31 RX ORDER — LABETALOL HYDROCHLORIDE 5 MG/ML
5 INJECTION, SOLUTION INTRAVENOUS
Status: DISCONTINUED | OUTPATIENT
Start: 2024-07-31 | End: 2024-07-31 | Stop reason: HOSPADM

## 2024-07-31 RX ADMIN — Medication 10 ML: at 20:04

## 2024-07-31 RX ADMIN — SODIUM CHLORIDE 2000 MG: 900 INJECTION INTRAVENOUS at 13:34

## 2024-07-31 RX ADMIN — MORPHINE SULFATE 2 MG: 2 INJECTION, SOLUTION INTRAMUSCULAR; INTRAVENOUS at 11:25

## 2024-07-31 RX ADMIN — MORPHINE SULFATE 2 MG: 2 INJECTION, SOLUTION INTRAMUSCULAR; INTRAVENOUS at 02:32

## 2024-07-31 RX ADMIN — SUGAMMADEX 200 MG: 100 INJECTION, SOLUTION INTRAVENOUS at 14:47

## 2024-07-31 RX ADMIN — PROPOFOL 25 MCG/KG/MIN: 10 INJECTION, EMULSION INTRAVENOUS at 13:57

## 2024-07-31 RX ADMIN — MORPHINE SULFATE 2 MG: 2 INJECTION, SOLUTION INTRAMUSCULAR; INTRAVENOUS at 07:13

## 2024-07-31 RX ADMIN — ROCURONIUM BROMIDE 50 MG: 10 INJECTION INTRAVENOUS at 13:35

## 2024-07-31 RX ADMIN — BUPIVACAINE HYDROCHLORIDE 60 ML: 2.5 INJECTION, SOLUTION EPIDURAL; INFILTRATION; INTRACAUDAL; PERINEURAL at 13:49

## 2024-07-31 RX ADMIN — LIDOCAINE HYDROCHLORIDE 50 MG: 10 INJECTION, SOLUTION EPIDURAL; INFILTRATION; INTRACAUDAL; PERINEURAL at 13:34

## 2024-07-31 RX ADMIN — HYDROMORPHONE HYDROCHLORIDE 1 MG: 2 INJECTION, SOLUTION INTRAMUSCULAR; INTRAVENOUS; SUBCUTANEOUS at 14:14

## 2024-07-31 RX ADMIN — SODIUM CHLORIDE, POTASSIUM CHLORIDE, SODIUM LACTATE AND CALCIUM CHLORIDE 75 ML/HR: 600; 310; 30; 20 INJECTION, SOLUTION INTRAVENOUS at 16:59

## 2024-07-31 RX ADMIN — PROPOFOL 200 MG: 10 INJECTION, EMULSION INTRAVENOUS at 13:34

## 2024-07-31 RX ADMIN — SODIUM CHLORIDE, POTASSIUM CHLORIDE, SODIUM LACTATE AND CALCIUM CHLORIDE 9 ML/HR: 600; 310; 30; 20 INJECTION, SOLUTION INTRAVENOUS at 12:41

## 2024-07-31 RX ADMIN — ROCURONIUM BROMIDE 20 MG: 10 INJECTION INTRAVENOUS at 14:20

## 2024-07-31 RX ADMIN — DEXAMETHASONE SODIUM PHOSPHATE 4 MG: 10 INJECTION, SOLUTION INTRAMUSCULAR; INTRAVENOUS at 13:49

## 2024-07-31 RX ADMIN — ACETAMINOPHEN 650 MG: 325 TABLET ORAL at 23:48

## 2024-07-31 RX ADMIN — DEXAMETHASONE SODIUM PHOSPHATE 6 MG: 10 INJECTION, SOLUTION INTRAMUSCULAR; INTRAVENOUS at 13:57

## 2024-07-31 RX ADMIN — ASPIRIN 81 MG: 81 TABLET, CHEWABLE ORAL at 08:44

## 2024-07-31 RX ADMIN — Medication 10 ML: at 08:48

## 2024-07-31 RX ADMIN — FAMOTIDINE 20 MG: 20 TABLET, FILM COATED ORAL at 12:41

## 2024-07-31 RX ADMIN — HYDROMORPHONE HYDROCHLORIDE 1 MG: 2 INJECTION, SOLUTION INTRAMUSCULAR; INTRAVENOUS; SUBCUTANEOUS at 13:58

## 2024-07-31 RX ADMIN — OXYCODONE HYDROCHLORIDE 10 MG: 10 TABLET ORAL at 22:42

## 2024-07-31 RX ADMIN — ONDANSETRON 4 MG: 2 INJECTION INTRAMUSCULAR; INTRAVENOUS at 14:36

## 2024-07-31 RX ADMIN — ROSUVASTATIN CALCIUM 20 MG: 20 TABLET, COATED ORAL at 08:44

## 2024-07-31 NOTE — ANESTHESIA POSTPROCEDURE EVALUATION
Patient: Yolande Acosta    Procedure Summary       Date: 07/31/24 Room / Location:  [facility] [address] [place] [address] [facility]    Anesthesia Start: 1327 Anesthesia Stop: 1508    Procedure: EXPLORATORY LAPAROTOMY, TOTAL ABDOMINAL HYSTERECTOMY BILATERAL SALPINGO OOPHORECTOMY, POSSIBLE TUMOR DEBULKING (Bilateral: Abdomen) Diagnosis:       Pelvic mass in female      Adnexal mass      (Pelvic mass in female [R19.00])      (Adnexal mass [N94.89])    Surgeons: Chivo Bautista MD Provider: Mickye Weiss MD    Anesthesia Type: general with block ASA Status: 2            Anesthesia Type: general with block    Vitals  Vitals Value Taken Time   /68 07/31/24 1508   Temp 99.2 °F (37.3 °C) 07/31/24 1508   Pulse 81 07/31/24 1508   Resp 14 07/31/24 1508   SpO2 97 % 07/31/24 1508           Post Anesthesia Care and Evaluation    Patient location during evaluation: PACU  Patient participation: waiting for patient participation  Level of consciousness: sleepy but conscious    Airway patency: patent  Anesthetic complications: No anesthetic complications  PONV Status: none  Cardiovascular status: blood pressure returned to baseline  Respiratory status: nasal cannula and spontaneous ventilation  Hydration status: acceptable  No anesthesia care post op

## 2024-07-31 NOTE — ANESTHESIA PREPROCEDURE EVALUATION
Anesthesia Evaluation     Patient summary reviewed and Nursing notes reviewed   NPO Solid Status: > 8 hours  NPO Liquid Status: > 8 hours           Airway   Mallampati: II  TM distance: >3 FB  Neck ROM: full  No difficulty expected  Dental - normal exam     Pulmonary - normal exam   Cardiovascular   Exercise tolerance: good (4-7 METS)    Rhythm: regular  Rate: normal        Neuro/Psych  GI/Hepatic/Renal/Endo      Musculoskeletal     Abdominal    Substance History      OB/GYN          Other                          Anesthesia Plan    ASA 2     general with block     (TAP blocks discussed and agreed)          CODE STATUS:    Code Status (Patient has no pulse and is not breathing): CPR (Attempt to Resuscitate)  Medical Interventions (Patient has pulse or is breathing): Full Support

## 2024-07-31 NOTE — ANESTHESIA PROCEDURE NOTES
Peripheral Block    Pre-sedation assessment completed: 7/31/2024 1:34 PM    Patient reassessed immediately prior to procedure    Patient location during procedure: OR  Start time: 7/31/2024 1:45 PM  Stop time: 7/31/2024 1:49 PM  Reason for block: at surgeon's request and post-op pain management  Performed by  Anesthesiologist: Mickey Weiss MD  Preanesthetic Checklist  Completed: patient identified, IV checked, site marked, risks and benefits discussed, surgical consent, monitors and equipment checked, pre-op evaluation and timeout performed  Prep:  Pt Position: supine  Sterile barriers:cap, gloves, mask and washed/disinfected hands  Prep: ChloraPrep  Patient monitoring: blood pressure monitoring, continuous pulse oximetry and EKG  Procedure    Sedation: yes  Performed under: general  Guidance:ultrasound guided  Images:still images obtained, printed/placed on chart    Laterality:Bilateral  Block Type:TAP  Injection Technique:single-shot  Needle Type:short-bevel and echogenic  Needle Gauge:20 G  Resistance on Injection: none    Medications Used: dexamethasone sodium phosphate injection - Injection   4 mg - 7/31/2024 1:49:00 PM  bupivacaine PF (MARCAINE) 0.25 % injection - Injection   60 mL - 7/31/2024 1:49:00 PM      Medications  Comment:Block Injection:  LA dose divided between Right and Left block        Post Assessment  Injection Assessment: negative aspiration for heme, incremental injection and no paresthesia on injection  Patient Tolerance:comfortable throughout block  Complications:no  Additional Notes    Subcostal TAPs    A high-frequency linear transducer, with sterile cover, was placed sub-xiphoid to identify Line[place] Alba, right and left Rectus Abdominus Muscles (HAILEY). The transducer was moved either right or left subcostally to identify the HAILEY and the Transverse Abdominus Muscle (MAE). The insertion site was prepped in sterile fashion and then localized with 2-5 ml of 1% Lidocaine. Using  "ultrasound-guidance, a 20-gauge B-Myers 4\" Ultraplex 360 non-stimulating echogenic needle was advanced in plane, from medial to lateral, until the tip of the needle was in the fascial plane between the HAILEY and MAE. 1-3ml of preservative free normal saline was used to hydro-dissect the fascial planes. After the fascial plane was verified, the local anesthetic (LA) was injected. The procedure was repeated on the opposite side for bilateral coverage. Aspiration every 5 ml to prevent intravascular injection. Injection was completed with negative aspiration of blood and negative intravascular injection. Injection pressures were normal with minimal resistance. The subcostal approach to the TAP nerve block ideally anesthetizes the intercostal nerves T6-T9.     Mid-Axillary/Lateral TAPs    A high-frequency linear transducer, with sterile cover, was placed in the midaxillary line between the ASIS and costal margin. The External Oblique Muscle (EOM), Internal Oblique Muscle (IOM), Transverse Abdominus Muscle (MAE), and Peritoneum were identified. The insertion site was prepped in sterile fashion and then localized with 2-5 ml of 1% Lidocaine. Using ultrasound-guidance, a 20-gauge B-Myers 4\" Ultraplex 360 non-stimulating echogenic needle was advanced in plane, from medial to lateral, until the tip of the needle was in the fascial plane between the IOM and MAE. 1-3ml of preservative free normal saline was used to hydro-dissect the fascial planes. After the fascial plane was verified, the local anesthetic (LA) was injected. The procedure was repeated on the opposite side for bilateral coverage. Aspiration every 5 ml to prevent intravascular injection. Injection was completed with negative aspiration of blood and negative intravascular injection. Injection pressures were normal with minimal resistance. Midaxillary TAPs should reach intercostal nerves T10- T11 and the subcostal nerve T12.    Performed by: Lacey Grant, " CRNA

## 2024-07-31 NOTE — H&P
[facility] Medicine Services  HISTORY AND PHYSICAL    Patient Name: Yolande Acosta  : 1955  MRN: [id number]  Primary Care Physician: Farrah Pandey DO  Date of admission: 2024    Subjective   Subjective     Chief Complaint:  Adnexal Mass    HPI:  Yolande Acosta is a 68 y.o. female HTN, and HLD who has transferred here from OSH for higher level of care rearding the finding of a new adnexal mass. Patient initially presented to [facility] ED today for a week of LLQ abdominal pain. She denies fever, chills, nausea, vomiting, or diarrhea. She initially thought this was diverticulitis, and changed her diet to try to remedy this, however since the pain did not improve, she went to the ED to be seen. Imaging at OSH showed the presence of a new large left adnexal mass. She denies any prior history of cancer.         Review of Systems   Constitutional:  Negative for chills, fatigue, fever and unexpected weight change.   HENT:  Negative for nosebleeds, sore throat and trouble swallowing.    Eyes:  Negative for photophobia and visual disturbance.   Respiratory:  Negative for cough, shortness of breath and wheezing.    Cardiovascular:  Negative for chest pain and palpitations.   Gastrointestinal:  Positive for abdominal pain (LLQ). Negative for diarrhea, nausea and vomiting.   Genitourinary:  Negative for dysuria and hematuria.   Musculoskeletal:  Negative for arthralgias and myalgias.   Skin: Negative.    Neurological:  Negative for tremors, syncope, speech difficulty and weakness.   Psychiatric/Behavioral:  Negative for confusion. The patient is not nervous/anxious.                 Personal History     Past Medical History:   Diagnosis Date    Elevated cholesterol     Hypertension      Past surgical history: Patient denies any prior surgical history.    Family History:  family history includes Breast cancer in her mother.     Social History: Patient denies any tobacco,  alcohol, licit drug use.    Medications:  Multi-Vitamin/Fluoride/Iron, Vitamin D (Cholecalciferol), aspirin, lisinopril, and rosuvastatin    No Known Allergies    Objective   Objective     Vital Signs:   Temp:  [98.1 °F (36.7 °C)] 98.1 °F (36.7 °C)  Heart Rate:  [78] 78  Resp:  [16] 16  BP: (123)/(69) 123/69    Physical Exam  Constitutional:       General: She is not in acute distress.     Appearance: Normal appearance.   HENT:      Head: Atraumatic.      Right Ear: External ear normal.      Left Ear: External ear normal.      Nose: Nose normal.   Eyes:      Extraocular Movements: Extraocular movements intact.      Conjunctiva/sclera: Conjunctivae normal.      Pupils: Pupils are equal, round, and reactive to light.   Cardiovascular:      Rate and Rhythm: Normal rate and regular rhythm.      Pulses: Normal pulses.      Heart sounds: Normal heart sounds. No murmur heard.  Pulmonary:      Effort: Pulmonary effort is normal. No respiratory distress.      Breath sounds: Normal breath sounds. No wheezing, rhonchi or rales.   Abdominal:      General: Bowel sounds are normal. There is no distension.      Tenderness: There is abdominal tenderness (LLQ). There is no guarding or rebound.   Musculoskeletal:         General: Normal range of motion.      Cervical back: No rigidity.      Right lower leg: No edema.      Left lower leg: No edema.   Skin:     General: Skin is warm and dry.      Coloration: Skin is not jaundiced.      Findings: No lesion or rash.   Neurological:      General: No focal deficit present.      Mental Status: She is alert and oriented to person, place, and time.   Psychiatric:         Attention and Perception: Attention normal.         Mood and Affect: Mood normal.         Behavior: Behavior normal.         Thought Content: Thought content normal.          Result Review:  I have personally reviewed the results from the time of this admission to 7/30/2024 22:30 EDT and agree with these findings:  [x]   Laboratory list / accordion  []  Microbiology  []  Radiology  []  EKG/Telemetry   []  Cardiology/Vascular   []  Pathology  [x]  Old records  []  Other:      LAB RESULTS:      Lab 07/30/24  1150   PROCALCITONIN <0.14             Lab 07/30/24  1150   AMYLASE 78   LIPASE 28                     Brief Urine Lab Results       None          Microbiology Results (last 10 days)       ** No results found for the last 240 hours. **            US non-ob transvaginal    Result Date: 7/30/2024  PELVIC ULTRASOUND HISTORY: Pelvic pain. PROCEDURE: Ultrasound images of the pelvis were obtained. FINDINGS: The uterus measures 8 x 5 cm. The endometrium is  normal at 3 mm. The right ovary demonstrates a 12 cm cystic mass. Mural complex  lesions are noted. The left ovary  is not identified.  There is no significant free fluid .    Impression: Large right adnexal mass. 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 Images reviewed, interpreted, and dictated by JIM Elder MD    CT Abdomen Pelvis With Contrast    Result Date: 7/30/2024  CT SCAN OF THE ABDOMEN AND PELVIS WITH CONTRAST    7/30/2024 12:31 PM HISTORY: Acute lower abdominal pain and tenderness. COMPARISON: None. PROCEDURE: The patient was injected with IV contrast. Axial images were obtained from the lung bases to the pubic symphysis by computed tomography. This study was performed with techniques to keep radiation doses as low as reasonably achievable, (ALARA). Individualized dose reduction techniques using automated exposure control or adjustment of mA and/or kV according to the patient size were employed. FINDINGS: ABDOMEN: The lung bases are clear. The heart is proper size. There is a small hiatal hernia. The liver is homogenous with no focal abnormality. There are gallstones within the gallbladder. The spleen is unremarkable. No adrenal mass is present.  The pancreas is unremarkable. The kidneys demonstrate parapelvic cysts bilaterally. There is a right renal stone  measuring 5 mm. There are multiple left renal stones measuring up to 20 mm. The aorta is proper caliber. There is no free fluid or adenopathy. PELVIS: There is sigmoid diverticulosis throughout the transverse colon. The GI tract demonstrates no obstruction.  The appendix is not identified. The urinary bladder is unremarkable. There is a cystic mass measuring up to 16 cm with multiple mural complex cysts measuring up to 4 cm. The uterus is deviated to the left.    Impression: Bilateral nephrolithiasis. Large cystic mass in the pelvis which extends into the abdomen. This likely represents ovarian neoplasm. Images reviewed, interpreted, and dictated by Dr. JIM Elder. Transcribed by Patricia Montenegro PA-C.         Assessment & Plan   Assessment & Plan       Adnexal mass    Left renal stone    Essential hypertension    Mixed hyperlipidemia      Yolande Acosta is a 68 y.o. female with a history of HTN and HLD who has transferred here from [facility] for higher level of care rearding the finding of a new adnexal mass.       Left Ovarian Mass  -Pelvic US as OSH showed the presence of a new 12cm left adnexal mass.   -GYN Onc consult for the am  -NPO tonight pending likely biopsy in the am  -Pain control    Left Renal Stone  -Imaging at OSH also showed the presence of a 2cm left non-obstructing renal stone.   -Will likely warrant follow-up outpatient with urology.    HTN  HLD  -Takes Lisinopril and Metoprolol. Continue.   -Continue statin.     DVT prophylaxis:  SCDS    CODE STATUS:  Full Code  Code Status (Patient has no pulse and is not breathing): CPR (Attempt to Resuscitate)  Medical Interventions (Patient has pulse or is breathing): Full Support      Expected Discharge1-2 days      This note has been completed as part of a split-shared workflow.     Signature: Electronically signed by Missael Vidal PA-C, 07/30/24, 9:51 PM EDT        Attending   Admission Attestation       I have performed an  independent face-to-face diagnostic evaluation including performing an independent physical examination.  I approve of the documented plan of care above that was reviewed and developed with the advanced practice clinician (APC) and take responsibility for that plan along with its associated risks.  I have updated the HPI as appropriate.    Brief HPI  Yolande Acosta is a 68 y.o. female who has transferred here from OSH for higher level of care regarding the finding of a new adnexal mass identified on CT scan. Patient initially presented to [facility] ED today for a week of LLQ abdominal pain. She denies fever, chills, nausea, vomiting, or diarrhea. She initially thought this was diverticulitis, and changed her diet to try to remedy this, however since the pain did not improve, she went to the ED to be seen. Imaging at OSH showed the presence of a new large left adnexal mass. She denies any prior history of cancer.  Patient reports maintaining appropriate preventative measures with mammograms, Pap smears.  She denies any historical abnormalities.  Gynecological oncology was consulted by transfer line, agreed to consult.  Will be by to see you in the morning.  Likely require IR biopsy of the mass and outpatient follow-up with results.  Will continue to monitor, appreciate consultant recommendations the care of this patient.    Attending Physical Exam:  Temp:  [98.1 °F (36.7 °C)-98.2 °F (36.8 °C)] 98.2 °F (36.8 °C)  Heart Rate:  [78-83] 83  Resp:  [16] 16  BP: (100-123)/(52-69) 100/52    Constitutional: Awake, alert  Eyes: PERRLA, sclerae anicteric, no conjunctival injection  HENT: NCAT, mucous membranes moist  Neck: Supple, no thyromegaly, no lymphadenopathy, trachea midline  Respiratory: Clear to auscultation bilaterally, nonlabored respirations   Cardiovascular: RRR, no murmurs, rubs, or gallops, palpable pedal pulses bilaterally  Gastrointestinal: Positive bowel sounds, soft, nondistended, significant tender  to palpation in the bilateral lower quadrants, worse on the right than the left.  Palpable mass appreciated just right of midline, inferior to the umbilicus.  No acute abdomen at this time.  Musculoskeletal: No bilateral ankle edema, no clubbing or cyanosis to extremities  Psychiatric: Appropriate affect, cooperative  Neurologic: Oriented x 3, strength symmetric in all extremities, Cranial Nerves grossly intact to confrontation, speech clear  Skin: No rashes      Result Review:  I have personally reviewed the results from the time of this admission to 7/30/2024 23:55 EDT and agree with these findings:  [x]  Laboratory list / accordion  [x]  Microbiology  [x]  Radiology  [x]  EKG/Telemetry   []  Cardiology/Vascular   []  Pathology  [x]  Old records  []  Other:  Most notable findings include: Summarized above    Assessment and Plan:    See assessment and plan documented by APC above and updated/edited by me as appropriate.    Paolo Curiel DO  07/30/24

## 2024-07-31 NOTE — PROGRESS NOTES
[facility] Medicine Services  PROGRESS NOTE    Patient Name: Yolande Acosta  : 1955  MRN: [id number]    Date of Admission: 2024  Primary Care Physician: Farrah Pandey DO    Subjective   Subjective     CC:  Adnexal Mass     HPI:  Evaluated patient this AM. Reporting lower abdominal tightness, no nausea or vomiting. Been present in room, discussed Lann with patient and .      Objective   Objective     Vital Signs:   Temp:  [98.1 °F (36.7 °C)-99.3 °F (37.4 °C)] 99.3 °F (37.4 °C)  Heart Rate:  [65-83] 78  Resp:  [10-16] 15  BP: ()/(52-75) 132/64  Flow (L/min):  [2-3] 2     Physical Exam:  Constitutional: Awake, alert, resting comfortably  HENT: NCAT, mucous membranes moist  Respiratory: Clear to auscultation bilaterally, respiratory effort normal   Cardiovascular: RRR, no murmurs, rubs, or gallops  Gastrointestinal: soft, nontender, nondistended, lower abdomen firm to palpation  Musculoskeletal: No bilateral ankle edema  Psychiatric: Appropriate affect, cooperative  Neurologic: Alert and oriented x 3, no focal deficits, speech clear  Skin: No rashes      Results Reviewed:  LAB RESULTS:      Lab 24  0500 24  1150   WBC 11.66* 14.23*  --    HEMOGLOBIN 11.5* 12.8  --    HEMATOCRIT 36.0 39.6  --    PLATELETS 254 269  --    NEUTROS ABS 9.15* 10.70*  --    IMMATURE GRANS (ABS) 0.04 0.03  --    LYMPHS ABS 1.35 2.24  --    MONOS ABS 1.01* 1.19*  --    EOS ABS 0.06 0.03  --    MCV 84.9 84.8  --    PROCALCITONIN  --   --  <0.14         Lab 24  0500 24   SODIUM 138 140   POTASSIUM 4.1 4.4   CHLORIDE 105 105   CO2 24.0 24.0   ANION GAP 9.0 11.0   BUN 10 8   CREATININE 0.80 0.85   EGFR 79.4 73.8   GLUCOSE 135* 100*   CALCIUM 8.3* 9.1         Lab 24 24  1150   TOTAL PROTEIN 7.2  --    ALBUMIN 3.7  --    GLOBULIN 3.5  --    ALT (SGPT) 21  --    AST (SGOT) 29  --    BILIRUBIN 0.8  --    ALK PHOS 100  --     AMYLASE  --  78   LIPASE  --  28                 Lab 07/31/24  1227   ABO TYPING O   RH TYPING Positive   ANTIBODY SCREEN Negative         Brief Urine Lab Results       None            Microbiology Results Abnormal       None            Peripheral Block    Result Date: 7/31/2024  Lacey Grant CRNA     7/31/2024  2:03 PM Peripheral Block Pre-sedation assessment completed: 7/31/2024 1:34 PM Patient reassessed immediately prior to procedure Patient location during procedure: OR Start time: 7/31/2024 1:45 PM Stop time: 7/31/2024 1:49 PM Reason for block: at surgeon's request and post-op pain management Performed by Anesthesiologist: Mickey Weiss MD Preanesthetic Checklist Completed: patient identified, IV checked, site marked, risks and benefits discussed, surgical consent, monitors and equipment checked, pre-op evaluation and timeout performed Prep: Pt Position: supine Sterile barriers:cap, gloves, mask and washed/disinfected hands Prep: ChloraPrep Patient monitoring: blood pressure monitoring, continuous pulse oximetry and EKG Procedure Sedation: yes Performed under: general Guidance:ultrasound guided Images:still images obtained, printed/placed on chart Laterality:Bilateral Block Type:TAP Injection Technique:single-shot Needle Type:short-bevel and echogenic Needle Gauge:20 G Resistance on Injection: none Medications Used: dexamethasone sodium phosphate injection - Injection  4 mg - 7/31/2024 1:49:00 PM bupivacaine PF (MARCAINE) 0.25 % injection - Injection  60 mL - 7/31/2024 1:49:00 PM Medications Comment:Block Injection:  LA dose divided between Right and Left block Post Assessment Injection Assessment: negative aspiration for heme, incremental injection and no paresthesia on injection Patient Tolerance:comfortable throughout block Complications:no Additional Notes Subcostal TAPs A high-frequency linear transducer, with sterile cover, was placed sub-xiphoid to identify [place], right and left  "Rectus Abdominus Muscles (HAILEY). The transducer was moved either right or left subcostally to identify the HAILEY and the Transverse Abdominus Muscle (MAE). The insertion site was prepped in sterile fashion and then localized with 2-5 ml of 1% Lidocaine. Using ultrasound-guidance, a 20-gauge B-Myers 4\" Ultraplex 360 non-stimulating echogenic needle was advanced in plane, from medial to lateral, until the tip of the needle was in the fascial plane between the HAILEY and MAE. 1-3ml of preservative free normal saline was used to hydro-dissect the fascial planes. After the fascial plane was verified, the local anesthetic (LA) was injected. The procedure was repeated on the opposite side for bilateral coverage. Aspiration every 5 ml to prevent intravascular injection. Injection was completed with negative aspiration of blood and negative intravascular injection. Injection pressures were normal with minimal resistance. The subcostal approach to the TAP nerve block ideally anesthetizes the intercostal nerves T6-T9. Mid-Axillary/Lateral TAPs A high-frequency linear transducer, with sterile cover, was placed in the midaxillary line between the ASIS and costal margin. The External Oblique Muscle (EOM), Internal Oblique Muscle (IOM), Transverse Abdominus Muscle (MAE), and Peritoneum were identified. The insertion site was prepped in sterile fashion and then localized with 2-5 ml of 1% Lidocaine. Using ultrasound-guidance, a 20-gauge B-Myers 4\" Ultraplex 360 non-stimulating echogenic needle was advanced in plane, from medial to lateral, until the tip of the needle was in the fascial plane between the IOM and MAE. 1-3ml of preservative free normal saline was used to hydro-dissect the fascial planes. After the fascial plane was verified, the local anesthetic (LA) was injected. The procedure was repeated on the opposite side for bilateral coverage. Aspiration every 5 ml to prevent intravascular injection. Injection was completed with " negative aspiration of blood and negative intravascular injection. Injection pressures were normal with minimal resistance. Midaxillary TAPs should reach intercostal nerves T10- T11 and the subcostal nerve T12.  Performed by: Lacey Grant CRNA     US non-ob transvaginal    Result Date: 7/30/2024  PELVIC ULTRASOUND HISTORY: Pelvic pain. PROCEDURE: Ultrasound images of the pelvis were obtained. FINDINGS: The uterus measures 8 x 5 cm. The endometrium is  normal at 3 mm. The right ovary demonstrates a 12 cm cystic mass. Mural complex  lesions are noted. The left ovary  is not identified.  There is no significant free fluid .    Impression: Large right adnexal mass. 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 Images reviewed, interpreted, and dictated by JIM Elder MD    CT Abdomen Pelvis With Contrast    Result Date: 7/30/2024  CT SCAN OF THE ABDOMEN AND PELVIS WITH CONTRAST    7/30/2024 12:31 PM HISTORY: Acute lower abdominal pain and tenderness. COMPARISON: None. PROCEDURE: The patient was injected with IV contrast. Axial images were obtained from the lung bases to the pubic symphysis by computed tomography. This study was performed with techniques to keep radiation doses as low as reasonably achievable, (ALARA). Individualized dose reduction techniques using automated exposure control or adjustment of mA and/or kV according to the patient size were employed. FINDINGS: ABDOMEN: The lung bases are clear. The heart is proper size. There is a small hiatal hernia. The liver is homogenous with no focal abnormality. There are gallstones within the gallbladder. The spleen is unremarkable. No adrenal mass is present.  The pancreas is unremarkable. The kidneys demonstrate parapelvic cysts bilaterally. There is a right renal stone measuring 5 mm. There are multiple left renal stones measuring up to 20 mm. The aorta is proper caliber. There is no free fluid or adenopathy. PELVIS: There is sigmoid  diverticulosis throughout the transverse colon. The GI tract demonstrates no obstruction.  The appendix is not identified. The urinary bladder is unremarkable. There is a cystic mass measuring up to 16 cm with multiple mural complex cysts measuring up to 4 cm. The uterus is deviated to the left.    Impression: Bilateral nephrolithiasis. Large cystic mass in the pelvis which extends into the abdomen. This likely represents ovarian neoplasm. Images reviewed, interpreted, and dictated by Dr. JIM Elder. Transcribed by Patricia Montenegro PA-C.         Current medications:  Scheduled Meds:[Held by provider] aspirin, 81 mg, Oral, Daily  [Held by provider] lisinopril, 10 mg, Oral, Daily  [START ON 8/1/2024] rosuvastatin, 20 mg, Oral, Nightly  sodium chloride, 10 mL, Intravenous, Q12H  sodium chloride, 3 mL, Intravenous, Q12H      Continuous Infusions:lactated ringers, 9 mL/hr, Last Rate: 9 mL/hr (07/31/24 1327)  sodium chloride, 75 mL/hr, Last Rate: 75 mL/hr (07/31/24 0633)      PRN Meds:.  acetaminophen **OR** acetaminophen **OR** acetaminophen    droperidol **OR** droperidol    fentanyl    hydrALAZINE    HYDROcodone-acetaminophen    HYDROmorphone    ipratropium-albuterol    labetalol    lactated ringers    melatonin    meperidine    naloxone    nitroglycerin    ondansetron ODT **OR** ondansetron    ondansetron    promethazine **OR** promethazine    sodium chloride    sodium chloride    sodium chloride    sodium chloride    Assessment & Plan   Assessment & Plan     Active Hospital Problems    Diagnosis  POA    **Adnexal mass [N94.89]  Yes    Essential hypertension [I10]  Yes    Mixed hyperlipidemia [E78.2]  Yes    Left renal stone [N20.0]  Yes    Pelvic mass in female [R19.00]  Unknown      Resolved Hospital Problems   No resolved problems to display.        Brief Hospital Course to date:  Yolande Acosta is a 68 y.o. female with PMHx of HTN, and HLD who was transferred here from [facility] for higher level of  care rearding the finding of a new adnexal mass. Patient initially presented to [facility] ED with a week of LLQ abdominal pain. Imaging at OSH showed the presence of a new large left adnexal mass.     This patient's problems and plans were partially entered by my partner and updated as appropriate by me 07/31/24.    Large pelvic mass  -Gyn Onc suspecting possibility of ovarian torsion prompting patient's pain, however malignancy still within differential.  -Pelvic US as OSH showed the presence of a new 12cm left adnexal mass.   -GYN Onc evaluated, performed exploratory laparotomy with JACK/BSO and tumor debulking on 7/31.  -Continue gentle hydration with fluids. Follow final pathology. Pain control with PRN Norco, IV Dilaudid. PRN Zofran for nausea.     Mild leukocytosis  -Likely reactive secondary to above  -Afebrile but with low-grade temp 99  -Monitor closely off of antibiotics for now.     Bilateral nephrolithiasis  -Imaging at OSH showed right renal stone 5 mm, multiple left renal stones measuring up to 20 mm.  -May need follow-up with urology on discharge.     Hypertension  -Holding lisinopril. Add back as appropriate.    Hyperlipidemia  -Continue rosuvastatin.    Expected Discharge Location and Transportation: Home  Expected Discharge   Expected Discharge Date: 8/2/2024; Expected Discharge Time:      VTE Prophylaxis:  Mechanical VTE prophylaxis orders are signed & held. Mechanical VTE prophylaxis orders are present.         AM-PAC 6 Clicks Score (PT): 24 (07/30/24 2110)    CODE STATUS:   Code Status and Medical Interventions: CPR (Attempt to Resuscitate); Full Support   Ordered at: 07/30/24 222     Code Status (Patient has no pulse and is not breathing):    CPR (Attempt to Resuscitate)     Medical Interventions (Patient has pulse or is breathing):    Full Support       Sanjuanita Orlando, DO  07/31/24

## 2024-07-31 NOTE — OP NOTE
Subjective     Date of Service:  07/31/24 13:45 EDT    Pre-operative diagnosis(es): Pre-Op Diagnosis Codes:     * Pelvic mass in female [R19.00]     * Adnexal mass [N94.89]         Post-operative diagnosis(es): Post-Op Diagnosis Codes:     * Pelvic mass in female [R19.00]     * Adnexal mass [N94.89]       Procedure(s): Procedure(s):  EXPLORATORY LAPAROTOMY, TOTAL ABDOMINAL HYSTERECTOMY BILATERAL SALPINGO OOPHORECTOMY, POSSIBLE TUMOR DEBULKING     Surgeon:  Assistant: Surgeons and Role:     * Chivo Bautista MD - Primary  Chivo Freedman     Anesthesia: Type: General with Block     IV Fluid: 800mL       Output: Est. Blood Loss 100mL  Urine Output 500mL  Other Output  none mL       Blood products used: No       Drains: * No LDAs found *       Specimens removed: A: uterus cervix, right tube and ovarfy  B: Left Tube and ovary     Complications: none       Intraoperative consult(s):  none    Condition: stable       Disposition: to PACU and then admit to  medical - surgical floor       Indications: Patient is a 68-year-old and excellent health who developed sudden onset of pain was seen in the emergency room and found to have a large pelvic mass.  Patient was then transferred to [facility] for further evaluation and treatment options.  I evaluated the patient and felt that it was most likely a ovarian torsion but the potential malignancy as discussed in detail and recommend proceeding forward with open hysterectomy and BSO with possible staging possible side effect of surgery.  The risks and benefits were discussed in detail and documented in her hospital note.    Implant Information: Nothing was implanted during the procedure      Assessment & Plan     Operative Findings: After informed consent was obtained the patient was taken back to the operating room placed under general esthesia prepped and draped in the sterile fashion for abdominal pelvic procedure in the supine position.  Abdominal exam under anesthesia  revealed a large mass extending to above the umbilicus.  Incision was made in the midline extending from above the umbilicus down to the pubic sepsis carried out sharply the fascia the fascia was incised and the peritoneal cavity was then entered.  An abdominal evaluation revealed a large right ovarian mass on a twisted pedicle consistent with ovarian torsion.  Washings were obtained.  There is no other intra-abdominal abnormalities with palpation of the upper abdomen omentum and bowel.  The mass appeared to be not adherent to any other structures other than some mild inflammatory changes associated with the black ovary and the pelvic sidewall.  The mass was then elevated to the pedicle clamped cut and secured.  With some manipulation was able to be removed from the abdominal cavity.  It was then opened off the operative site closely inspected and revealed old hemorrhagic blood with what appeared to be significant necrosis without any obvious excrescence or lesions concerning for malignancy.  At this point the mass was then sent to pathology for permanent report.  The vocal retractor was put in position.  2 Tere clamps were then used for uterine manipulation.  The bilateral round ligaments were clamped cut and secured the bilateral retroperitoneal spaces were explored and the ureters were identified.  With the ureters identified the IP's were isolated clamped and secured with Vicryl ties.  Attention is then drawn to the bladder flap and the serosa of the bladder reflection was elevated incised and the bladder sharply dissected off the underlying cervix down to level below the uterosacral ligaments.  The bilateral uterine arteries were then clamped cut and secured with Vicryl stitches.  The parametrial tissue serially clamped and secured with Vicryl stitches down to the level uterosacral ligaments.  The vagina was instructed and circumscribed long move of the uterus cervix and residual tube and ovary.  Copious  amounts of irrigation were then performed.  The vaginal cuff was then closed with interrupted figure-of-eight's of 0 Vicryl.  Close inspection of the retroperitoneum the ureters and the vaginal cuff revealed good hemostasis and the bilateral ureters were peristalsing without any evidence of hydronephrosis.  The large bowel out of the pelvis first followed by the small bowel supertight from the abdominal incision with the omentum.  The fascia was then reapproximated in 2 loop EDSS starting to corner and meeting in the midline.  Few areas of disease external the Bovie coagulator.  Skin edges reapproximated with a 3-0 Monocryl and the wound was then further secured with skin glue.  This concluded the case.  Final instrument needle and sponge count were correct patient tolerated the procedure well extubated in the operating room transported to the PACU the to the postoperative floor for the remainder of her hospital stay.              Chivo Bautista MD  07/31/24  13:45 EDT

## 2024-07-31 NOTE — PLAN OF CARE
Problem: Adult Inpatient Plan of Care  Goal: Plan of Care Review  Outcome: Ongoing, Progressing  Flowsheets (Taken 7/31/2024 0422)  Progress: no change  Plan of Care Reviewed With:   patient   spouse     Problem: Pain Acute  Goal: Acceptable Pain Control and Functional Ability  Outcome: Ongoing, Progressing  Intervention: Prevent or Manage Pain  Recent Flowsheet Documentation  Taken 7/31/2024 0400 by Kimberly Meza RN  Medication Review/Management: medications reviewed  Taken 7/31/2024 0232 by Kimberly Meza RN  Medication Review/Management: medications reviewed  Taken 7/31/2024 0010 by Kimberly Meza RN  Medication Review/Management: medications reviewed  Taken 7/30/2024 2230 by Kimberly Meza RN  Medication Review/Management: medications reviewed     Problem: Adult Inpatient Plan of Care  Goal: Absence of Hospital-Acquired Illness or Injury  Intervention: Prevent and Manage VTE (Venous Thromboembolism) Risk  Recent Flowsheet Documentation  Taken 7/31/2024 0400 by Kimberly Meza RN  Activity Management: patient refuses activity  Taken 7/31/2024 0232 by Kimberly Meza RN  Activity Management: ambulated to bathroom  Taken 7/31/2024 0010 by Kimberly Meza RN  Activity Management: activity encouraged  Taken 7/30/2024 2230 by Kimberly Meza RN  Activity Management: activity encouraged     Problem: Adult Inpatient Plan of Care  Goal: Absence of Hospital-Acquired Illness or Injury  Intervention: Prevent Skin Injury  Recent Flowsheet Documentation  Taken 7/31/2024 0400 by Kimberly Meza RN  Body Position: position changed independently  Taken 7/31/2024 0232 by Kimberly Meza RN  Body Position: position changed independently  Taken 7/31/2024 0010 by Kimberly Meza RN  Body Position: position changed independently  Taken 7/30/2024 2230 by Kimberly Meza RN  Body Position: position changed independently   Goal Outcome Evaluation:  Plan of Care Reviewed With: patient, spouse        Progress: no  change

## 2024-07-31 NOTE — ANESTHESIA PROCEDURE NOTES
Airway  Urgency: elective    Date/Time: 7/31/2024 1:36 PM  Airway not difficult    General Information and Staff    Patient location during procedure: OR  CRNA/CAA: Lacey Grant CRNA    Indications and Patient Condition  Indications for airway management: airway protection    Preoxygenated: yes  MILS not maintained throughout  Mask difficulty assessment: 1 - vent by mask    Final Airway Details  Final airway type: endotracheal airway      Successful airway: ETT  Cuffed: yes   Successful intubation technique: direct laryngoscopy  Facilitating devices/methods: intubating stylet  Endotracheal tube insertion site: oral  Blade: Yessy  Blade size: 3  ETT size (mm): 7.0  Cormack-Lehane Classification: grade I - full view of glottis  Placement verified by: chest auscultation and capnometry   Measured from: lips  ETT/EBT  to lips (cm): 21  Number of attempts at approach: 1  Assessment: lips, teeth, and gum same as pre-op and atraumatic intubation    Additional Comments  Negative epigastric sounds, Breath sound equal bilaterally with symmetric chest rise and fall

## 2024-07-31 NOTE — PAYOR COMM NOTE
"Kellie Benoit (68 y.o. Female)     [id number]     Terri Jain, DANIA  Utilization Review  Fkize-[phone]  [phone]        Date of Birth   1955    Social Security Number       Address   [address] [place] [address]  [facility] [address]    Home Phone       MRN   [id number]       [facility]    Marital Status                               Admission Date   24    Admission Type   Urgent    Admitting Provider   Sanjuanita Orlando DO    Attending Provider   Sanjuanita Orlando DO    Department, Room/Bed   [facility] OR, [facility] OR/MAIN OR       Discharge Date       Discharge Disposition       Discharge Destination                                 Attending Provider: Sanjuanita Orlando DO    Allergies: No Known Allergies    Isolation: None   Infection: None   Code Status: CPR    Ht: 160 cm (63\")   Wt: 64.4 kg (142 lb)    Admission Cmt: None   Principal Problem: Adnexal mass [N94.89]                   Active Insurance as of 2024       Primary Coverage       Payor Plan Insurance Group Employer/Plan Group    ANTHEM MEDICARE REPLACEMENT ANTHEM MEDICARE ADVANTAGE [id number]       Payor Plan Address Payor Plan Phone Number Payor Plan Fax Number Effective Dates    [address] [phone]  2024 - None Entered    [facility] [id number]         Subscriber Name Subscriber Birth Date Member ID       KELLIE BENOIT 1955 [id number]                     Emergency Contacts        (Rel.) Home Phone Work Phone Mobile Phone    BenoitJm (Spouse) [phone] -- [phone]                 History & Physical        Paolo Curiel DO at 24 [phone]              [facility] Medicine Services  HISTORY AND PHYSICAL    Patient Name: Kellie Benoit  : 1955  MRN: [id number]  Primary Care Physician: Farrah Pandey DO  Date of admission: 2024    Subjective  Subjective     Chief Complaint:  Adnexal " Mass    HPI:  Yolande Acosta is a 68 y.o. female HTN, and HLD who has transferred here from OSH for higher level of care rearding the finding of a new adnexal mass. Patient initially presented to [facility] ED today for a week of LLQ abdominal pain. She denies fever, chills, nausea, vomiting, or diarrhea. She initially thought this was diverticulitis, and changed her diet to try to remedy this, however since the pain did not improve, she went to the ED to be seen. Imaging at OSH showed the presence of a new large left adnexal mass. She denies any prior history of cancer.         Review of Systems   Constitutional:  Negative for chills, fatigue, fever and unexpected weight change.   HENT:  Negative for nosebleeds, sore throat and trouble swallowing.    Eyes:  Negative for photophobia and visual disturbance.   Respiratory:  Negative for cough, shortness of breath and wheezing.    Cardiovascular:  Negative for chest pain and palpitations.   Gastrointestinal:  Positive for abdominal pain (LLQ). Negative for diarrhea, nausea and vomiting.   Genitourinary:  Negative for dysuria and hematuria.   Musculoskeletal:  Negative for arthralgias and myalgias.   Skin: Negative.    Neurological:  Negative for tremors, syncope, speech difficulty and weakness.   Psychiatric/Behavioral:  Negative for confusion. The patient is not nervous/anxious.                 Personal History     Past Medical History:   Diagnosis Date    Elevated cholesterol     Hypertension      Past surgical history: Patient denies any prior surgical history.    Family History:  family history includes Breast cancer in her mother.     Social History: Patient denies any tobacco, alcohol, licit drug use.    Medications:  Multi-Vitamin/Fluoride/Iron, Vitamin D (Cholecalciferol), aspirin, lisinopril, and rosuvastatin    No Known Allergies    Objective  Objective     Vital Signs:   Temp:  [98.1 °F (36.7 °C)] 98.1 °F (36.7 °C)  Heart Rate:  [78] 78  Resp:  [16]  16  BP: (123)/(69) 123/69    Physical Exam  Constitutional:       General: She is not in acute distress.     Appearance: Normal appearance.   HENT:      Head: Atraumatic.      Right Ear: External ear normal.      Left Ear: External ear normal.      Nose: Nose normal.   Eyes:      Extraocular Movements: Extraocular movements intact.      Conjunctiva/sclera: Conjunctivae normal.      Pupils: Pupils are equal, round, and reactive to light.   Cardiovascular:      Rate and Rhythm: Normal rate and regular rhythm.      Pulses: Normal pulses.      Heart sounds: Normal heart sounds. No murmur heard.  Pulmonary:      Effort: Pulmonary effort is normal. No respiratory distress.      Breath sounds: Normal breath sounds. No wheezing, rhonchi or rales.   Abdominal:      General: Bowel sounds are normal. There is no distension.      Tenderness: There is abdominal tenderness (LLQ). There is no guarding or rebound.   Musculoskeletal:         General: Normal range of motion.      Cervical back: No rigidity.      Right lower leg: No edema.      Left lower leg: No edema.   Skin:     General: Skin is warm and dry.      Coloration: Skin is not jaundiced.      Findings: No lesion or rash.   Neurological:      General: No focal deficit present.      Mental Status: She is alert and oriented to person, place, and time.   Psychiatric:         Attention and Perception: Attention normal.         Mood and Affect: Mood normal.         Behavior: Behavior normal.         Thought Content: Thought content normal.          Result Review:  I have personally reviewed the results from the time of this admission to 7/30/2024 22:30 EDT and agree with these findings:  [x]  Laboratory list / accordion  []  Microbiology  []  Radiology  []  EKG/Telemetry   []  Cardiology/Vascular   []  Pathology  [x]  Old records  []  Other:      LAB RESULTS:      Lab 07/30/24  1150   PROCALCITONIN <0.14             Lab 07/30/24  1150   AMYLASE 78   LIPASE 28                      Brief Urine Lab Results       None          Microbiology Results (last 10 days)       ** No results found for the last 240 hours. **            US non-ob transvaginal    Result Date: 7/30/2024  PELVIC ULTRASOUND HISTORY: Pelvic pain. PROCEDURE: Ultrasound images of the pelvis were obtained. FINDINGS: The uterus measures 8 x 5 cm. The endometrium is  normal at 3 mm. The right ovary demonstrates a 12 cm cystic mass. Mural complex  lesions are noted. The left ovary  is not identified.  There is no significant free fluid .    Impression: Large right adnexal mass. 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 Images reviewed, interpreted, and dictated by JIM Elder MD    CT Abdomen Pelvis With Contrast    Result Date: 7/30/2024  CT SCAN OF THE ABDOMEN AND PELVIS WITH CONTRAST    7/30/2024 12:31 PM HISTORY: Acute lower abdominal pain and tenderness. COMPARISON: None. PROCEDURE: The patient was injected with IV contrast. Axial images were obtained from the lung bases to the pubic symphysis by computed tomography. This study was performed with techniques to keep radiation doses as low as reasonably achievable, (ALARA). Individualized dose reduction techniques using automated exposure control or adjustment of mA and/or kV according to the patient size were employed. FINDINGS: ABDOMEN: The lung bases are clear. The heart is proper size. There is a small hiatal hernia. The liver is homogenous with no focal abnormality. There are gallstones within the gallbladder. The spleen is unremarkable. No adrenal mass is present.  The pancreas is unremarkable. The kidneys demonstrate parapelvic cysts bilaterally. There is a right renal stone measuring 5 mm. There are multiple left renal stones measuring up to 20 mm. The aorta is proper caliber. There is no free fluid or adenopathy. PELVIS: There is sigmoid diverticulosis throughout the transverse colon. The GI tract demonstrates no obstruction.  The appendix is not  identified. The urinary bladder is unremarkable. There is a cystic mass measuring up to 16 cm with multiple mural complex cysts measuring up to 4 cm. The uterus is deviated to the left.    Impression: Bilateral nephrolithiasis. Large cystic mass in the pelvis which extends into the abdomen. This likely represents ovarian neoplasm. Images reviewed, interpreted, and dictated by Dr. JIM Elder. Transcribed by Patricia Montenegro PA-C.         Assessment & Plan  Assessment & Plan       Adnexal mass    Left renal stone    Essential hypertension    Mixed hyperlipidemia      Yolande Acosta is a 68 y.o. female with a history of HTN and HLD who has transferred here from [facility] for higher level of care rearding the finding of a new adnexal mass.       Left Ovarian Mass  -Pelvic US as OSH showed the presence of a new 12cm left adnexal mass.   -GYN Onc consult for the am  -NPO tonight pending likely biopsy in the am  -Pain control    Left Renal Stone  -Imaging at OSH also showed the presence of a 2cm left non-obstructing renal stone.   -Will likely warrant follow-up outpatient with urology.    HTN  HLD  -Takes Lisinopril and Metoprolol. Continue.   -Continue statin.     DVT prophylaxis:  SCDS    CODE STATUS:  Full Code  Code Status (Patient has no pulse and is not breathing): CPR (Attempt to Resuscitate)  Medical Interventions (Patient has pulse or is breathing): Full Support      Expected Discharge1-2 days      This note has been completed as part of a split-shared workflow.     Signature: Electronically signed by Missael Vidal PA-C, 07/30/24, 9:51 PM EDT        Attending   Admission Attestation       I have performed an independent face-to-face diagnostic evaluation including performing an independent physical examination.  I approve of the documented plan of care above that was reviewed and developed with the advanced practice clinician (APC) and take responsibility for that plan along with its  associated risks.  I have updated the HPI as appropriate.    Brief HPI  Yolnade Acosta is a 68 y.o. female who has transferred here from OSH for higher level of care regarding the finding of a new adnexal mass identified on CT scan. Patient initially presented to [facility] ED today for a week of LLQ abdominal pain. She denies fever, chills, nausea, vomiting, or diarrhea. She initially thought this was diverticulitis, and changed her diet to try to remedy this, however since the pain did not improve, she went to the ED to be seen. Imaging at OSH showed the presence of a new large left adnexal mass. She denies any prior history of cancer.  Patient reports maintaining appropriate preventative measures with mammograms, Pap smears.  She denies any historical abnormalities.  Gynecological oncology was consulted by transfer line, agreed to consult.  Will be by to see you in the morning.  Likely require IR biopsy of the mass and outpatient follow-up with results.  Will continue to monitor, appreciate consultant recommendations the care of this patient.    Attending Physical Exam:  Temp:  [98.1 °F (36.7 °C)-98.2 °F (36.8 °C)] 98.2 °F (36.8 °C)  Heart Rate:  [78-83] 83  Resp:  [16] 16  BP: (100-123)/(52-69) 100/52    Constitutional: Awake, alert  Eyes: PERRLA, sclerae anicteric, no conjunctival injection  HENT: NCAT, mucous membranes moist  Neck: Supple, no thyromegaly, no lymphadenopathy, trachea midline  Respiratory: Clear to auscultation bilaterally, nonlabored respirations   Cardiovascular: RRR, no murmurs, rubs, or gallops, palpable pedal pulses bilaterally  Gastrointestinal: Positive bowel sounds, soft, nondistended, significant tender to palpation in the bilateral lower quadrants, worse on the right than the left.  Palpable mass appreciated just right of midline, inferior to the umbilicus.  No acute abdomen at this time.  Musculoskeletal: No bilateral ankle edema, no clubbing or cyanosis to  extremities  Psychiatric: Appropriate affect, cooperative  Neurologic: Oriented x 3, strength symmetric in all extremities, Cranial Nerves grossly intact to confrontation, speech clear  Skin: No rashes      Result Review:  I have personally reviewed the results from the time of this admission to 7/30/2024 23:55 EDT and agree with these findings:  [x]  Laboratory list / accordion  [x]  Microbiology  [x]  Radiology  [x]  EKG/Telemetry   []  Cardiology/Vascular   []  Pathology  [x]  Old records  []  Other:  Most notable findings include: Summarized above    Assessment and Plan:    See assessment and plan documented by APC above and updated/edited by me as appropriate.    Paolo Curiel DO  07/30/24                     Electronically signed by Paolo Curiel DO at 07/31/24 0004       Emergency Department Notes    No notes of this type exist for this encounter.       Vital Signs (last day)       Date/Time Temp Temp src Pulse Resp BP Patient Position SpO2    07/31/24 1600 -- -- 78 15 132/64 -- 96    07/31/24 1545 99.3 (37.4) -- 76 11 129/69 -- 95    07/31/24 1530 -- -- 77 12 128/67 -- 95    07/31/24 1520 -- -- 73 12 119/62 -- 95    07/31/24 1515 -- -- 76 11 128/60 -- 88    07/31/24 1510 -- -- 78 10 130/63 -- 96    07/31/24 1508 99.2 (37.3) Temporal 81 14 148/68 -- 97    07/31/24 1505 99.2 (37.3) Temporal 77 10 148/68 Lying 97    07/31/24 1234 98.4 (36.9) Temporal 73 15 120/66 Lying 96    07/31/24 1115 98.2 (36.8) Oral 65 16 106/52 -- 94    07/31/24 0844 -- -- 67 -- 99/75 -- --    07/31/24 0357 98.1 (36.7) Oral 67 16 105/59 Lying 95    07/30/24 2328 98.2 (36.8) Oral 83 16 100/52 Lying 95    07/30/24 2103 98.1 (36.7) Oral 78 16 123/69 Lying 98          Oxygen Therapy (last day)       Date/Time SpO2 Device (Oxygen Therapy) Flow (L/min) Oxygen Concentration (%) ETCO2 (mmHg)    07/31/24 1600 96 room air -- -- 38    07/31/24 1545 95 nasal cannula with ETCO2 2 -- 40    07/31/24 1530 95 nasal cannula with ETCO2 2 -- 39    07/31/24  1520 95 nasal cannula with ETCO2 2 -- 42    07/31/24 1515 88 nasal cannula with ETCO2 2 -- 42    07/31/24 1510 96 nasal cannula with ETCO2 2 -- 42    07/31/24 1508 97 nasal cannula with ETCO2 -- -- --    07/31/24 1505 97 nasal cannula with ETCO2 3 -- --    07/31/24 1234 96 -- -- -- --    07/31/24 1115 94 -- -- -- --    07/31/24 0357 95 -- -- -- --    07/30/24 2328 95 -- -- -- --    07/30/24 2103 98 room air -- -- --          Lines, Drains & Airways       Active LDAs       Name Placement date Placement time Site Days    Peripheral IV 07/31/24 0330 Anterior;Right Forearm 07/31/24  0330  Forearm  less than 1    Urethral Catheter Silicone 16 Fr. 07/31/24  1355  -- less than 1                  Current Facility-Administered Medications   Medication Dose Route Frequency Provider Last Rate Last Admin    acetaminophen (TYLENOL) tablet 650 mg  650 mg Oral Q4H PRN Missael Vidal PA-C        Or    acetaminophen (TYLENOL) 160 MG/5ML oral solution 650 mg  650 mg Oral Q4H PRN Missael Vidal PA-C        Or    acetaminophen (TYLENOL) suppository 650 mg  650 mg Rectal Q4H PRN Missael Vidal PA-C        [Held by provider] aspirin chewable tablet 81 mg  81 mg Oral Daily Missael Vidal PA-C   81 mg at 07/31/24 0844    droperidol (INAPSINE) injection 0.625 mg  0.625 mg Intravenous Q15 Min PRN Lacey Grant CRNA        Or    droperidol (INAPSINE) injection 0.625 mg  0.625 mg Intramuscular Once PRN Lacey Grant CRNA        fentaNYL citrate (PF) (SUBLIMAZE) injection 50 mcg  50 mcg Intravenous Q5 Min PRN Lacey Grant CRNA        hydrALAZINE (APRESOLINE) injection 5 mg  5 mg Intravenous Q10 Min PRN Lacey Grant CRNA        HYDROcodone-acetaminophen (NORCO) 5-325 MG per tablet 1 tablet  1 tablet Oral Once PRN Lacey Grant CRNA        HYDROmorphone (DILAUDID) injection 0.5 mg  0.5 mg Intravenous Q10 Min PRN Lacey Grant CRNA        ipratropium-albuterol (DUO-NEB)  nebulizer solution 3 mL  3 mL Nebulization Once PRN Lacey Grnat CRNA        labetalol (NORMODYNE,TRANDATE) injection 5 mg  5 mg Intravenous Q5 Min PRN Lacey Grant CRNA        lactated ringers bolus 250 mL  250 mL Intravenous Once PRN Lacey Grant CRNA        lactated ringers infusion  9 mL/hr Intravenous Continuous Cruz Lanza MD 9 mL/hr at 07/31/24 1327 Restarted at 07/31/24 1443    [Held by provider] lisinopril (PRINIVIL,ZESTRIL) tablet 10 mg  10 mg Oral Daily Missael Vidal PA-C        melatonin tablet 5 mg  5 mg Oral Nightly PRN Missael Vidal PA-C        meperidine (DEMEROL) injection 12.5 mg  12.5 mg Intravenous Q5 Min PRN Lacey Grant CRNA        naloxone (NARCAN) injection 0.4 mg  0.4 mg Intravenous PRN Lacey Grant CRNA        nitroglycerin (NITROSTAT) SL tablet 0.4 mg  0.4 mg Sublingual Q5 Min PRN Missael Vidal PA-C        ondansetron ODT (ZOFRAN-ODT) disintegrating tablet 4 mg  4 mg Oral Q6H PRN Missael Vidal PA-C        Or    ondansetron (ZOFRAN) injection 4 mg  4 mg Intravenous Q6H PRN Missael Vidal PA-C        ondansetron (ZOFRAN) injection 4 mg  4 mg Intravenous Once PRN Lacey Grant CRNA        promethazine (PHENERGAN) suppository 25 mg  25 mg Rectal Once PRN Lacey Grant CRNA        Or    promethazine (PHENERGAN) tablet 25 mg  25 mg Oral Once PRN Lacey Grant CRNA        [START ON 8/1/2024] rosuvastatin (CRESTOR) tablet 20 mg  20 mg Oral Nightly Marcinkevicius, Sanjuanita, DO        sodium chloride 0.9 % flush 10 mL  10 mL Intravenous Q12H Missael Vidal PA-C   10 mL at 07/31/24 0848    sodium chloride 0.9 % flush 10 mL  10 mL Intravenous PRN Lincoln Vidal-CHRIS Alanis        sodium chloride 0.9 % flush 3 mL  3 mL Intravenous Q12H Lacey Grant CRNA        sodium chloride 0.9 % flush 3-10 mL  3-10 mL Intravenous PRN Lacey Grant CRNA        sodium chloride 0.9 %  infusion 40 mL  40 mL Intravenous PRN Missael Vidal PA-C        sodium chloride 0.9 % infusion 40 mL  40 mL Intravenous PRN Lacey Grant CRNA        sodium chloride 0.9 % infusion  75 mL/hr Intravenous Continuous Missael Vidal PA-C 75 mL/hr at 07/31/24 0633 75 mL/hr at 07/31/24 0633     Lab Results (last 24 hours)       Procedure Component Value Units Date/Time    CBC & Differential [[id number]]  (Abnormal) Collected: 07/31/24 0500    Specimen: Blood Updated: 07/31/24 0605    Narrative:      The following orders were created for panel order CBC & Differential.  Procedure                               Abnormality         Status                     ---------                               -----------         ------                     CBC Auto Differential[[id number]]        Abnormal            Final result                 Please view results for these tests on the individual orders.    CBC Auto Differential [[id number]]  (Abnormal) Collected: 07/31/24 0500    Specimen: Blood Updated: 07/31/24 0605     WBC 11.66 10*3/mm3      RBC 4.24 10*6/mm3      Hemoglobin 11.5 g/dL      Hematocrit 36.0 %      MCV 84.9 fL      MCH 27.1 pg      MCHC 31.9 g/dL      RDW 14.2 %      RDW-SD 44.0 fl      MPV 10.9 fL      Platelets 254 10*3/mm3      Neutrophil % 78.5 %      Lymphocyte % 11.6 %      Monocyte % 8.7 %      Eosinophil % 0.5 %      Basophil % 0.4 %      Immature Grans % 0.3 %      Neutrophils, Absolute 9.15 10*3/mm3      Lymphocytes, Absolute 1.35 10*3/mm3      Monocytes, Absolute 1.01 10*3/mm3      Eosinophils, Absolute 0.06 10*3/mm3      Basophils, Absolute 0.05 10*3/mm3      Immature Grans, Absolute 0.04 10*3/mm3      nRBC 0.0 /100 WBC     Basic Metabolic Panel [[id number]]  (Abnormal) Collected: 07/31/24 0500    Specimen: Blood Updated: 07/31/24 0559     Glucose 135 mg/dL      BUN 10 mg/dL      Creatinine 0.80 mg/dL      Sodium 138 mmol/L      Potassium 4.1 mmol/L      Chloride 105 mmol/L      CO2  24.0 mmol/L      Calcium 8.3 mg/dL      BUN/Creatinine Ratio 12.5     Anion Gap 9.0 mmol/L      eGFR 79.4 mL/min/1.73     Narrative:      GFR Normal >60  Chronic Kidney Disease <60  Kidney Failure <15      Comprehensive Metabolic Panel [[id number]]  (Abnormal) Collected: 07/30/24 2211    Specimen: Blood Updated: 07/30/24 2247     Glucose 100 mg/dL      BUN 8 mg/dL      Creatinine 0.85 mg/dL      Sodium 140 mmol/L      Potassium 4.4 mmol/L      Chloride 105 mmol/L      CO2 24.0 mmol/L      Calcium 9.1 mg/dL      Total Protein 7.2 g/dL      Albumin 3.7 g/dL      ALT (SGPT) 21 U/L      AST (SGOT) 29 U/L      Alkaline Phosphatase 100 U/L      Total Bilirubin 0.8 mg/dL      Globulin 3.5 gm/dL      Comment: Calculated Result        A/G Ratio 1.1 g/dL      BUN/Creatinine Ratio 9.4     Anion Gap 11.0 mmol/L      eGFR 73.8 mL/min/1.73     Narrative:      GFR Normal >60  Chronic Kidney Disease <60  Kidney Failure <15      CBC & Differential [[id number]]  (Abnormal) Collected: 07/30/24 2211    Specimen: Blood Updated: 07/30/24 2246    Narrative:      The following orders were created for panel order CBC & Differential.  Procedure                               Abnormality         Status                     ---------                               -----------         ------                     CBC Auto Differential[[id number]]        Abnormal            Final result                 Please view results for these tests on the individual orders.    CBC Auto Differential [[id number]]  (Abnormal) Collected: 07/30/24 2211    Specimen: Blood Updated: 07/30/24 2246     WBC 14.23 10*3/mm3      RBC 4.67 10*6/mm3      Hemoglobin 12.8 g/dL      Hematocrit 39.6 %      MCV 84.8 fL      MCH 27.4 pg      MCHC 32.3 g/dL      RDW 14.2 %      RDW-SD 43.7 fl      MPV 10.7 fL      Platelets 269 10*3/mm3      Neutrophil % 75.2 %      Lymphocyte % 15.7 %      Monocyte % 8.4 %      Eosinophil % 0.2 %      Basophil % 0.3 %      Immature Grans % 0.2 %       Neutrophils, Absolute 10.70 10*3/mm3      Lymphocytes, Absolute 2.24 10*3/mm3      Monocytes, Absolute 1.19 10*3/mm3      Eosinophils, Absolute 0.03 10*3/mm3      Basophils, Absolute 0.04 10*3/mm3      Immature Grans, Absolute 0.03 10*3/mm3      nRBC 0.0 /100 WBC           Imaging Results (Last 24 Hours)       ** No results found for the last 24 hours. **          ECG/EMG Results (last 24 hours)       Procedure Component Value Units Date/Time    ECG 12 Lead QT Measurement [[id number]] Collected: 07/30/24 2200     Updated: 07/31/24 0731     QT Interval 370 ms      QTC Interval 407 ms     Narrative:      Test Reason : QT Measurement  Blood Pressure :   */*   mmHG  Vent. Rate :  73 BPM     Atrial Rate :  73 BPM     P-R Int : 188 ms          QRS Dur :  88 ms      QT Int : 370 ms       P-R-T Axes :  32  -8  17 degrees     QTc Int : 407 ms    Normal sinus rhythm  Normal ECG  No previous ECGs available    Referred By:            Confirmed By:              Operative/Procedure Notes (last 24 hours)        Chivo Bautista MD at 07/31/24 1245            Subjective     Date of Service:  07/31/24 13:45 EDT    Pre-operative diagnosis(es): Pre-Op Diagnosis Codes:     * Pelvic mass in female [R19.00]     * Adnexal mass [N94.89]         Post-operative diagnosis(es): Post-Op Diagnosis Codes:     * Pelvic mass in female [R19.00]     * Adnexal mass [N94.89]       Procedure(s): Procedure(s):  EXPLORATORY LAPAROTOMY, TOTAL ABDOMINAL HYSTERECTOMY BILATERAL SALPINGO OOPHORECTOMY, POSSIBLE TUMOR DEBULKING     Surgeon:  Assistant: Surgeons and Role:     * Chivo Bautista MD - Primary  Chivo Freedman     Anesthesia: Type: General with Block     IV Fluid: 800mL       Output: Est. Blood Loss 100mL  Urine Output 500mL  Other Output  none mL       Blood products used: No       Drains: * No LDAs found *       Specimens removed: A: uterus cervix, right tube and ovarfy  B: Left Tube and ovary     Complications: none       Intraoperative consult(s):   none    Condition: stable       Disposition: to PACU and then admit to  medical - surgical floor       Indications: Patient is a 68-year-old and excellent health who developed sudden onset of pain was seen in the emergency room and found to have a large pelvic mass.  Patient was then transferred to [facility] for further evaluation and treatment options.  I evaluated the patient and felt that it was most likely a ovarian torsion but the potential malignancy as discussed in detail and recommend proceeding forward with open hysterectomy and BSO with possible staging possible side effect of surgery.  The risks and benefits were discussed in detail and documented in her hospital note.    Implant Information: Nothing was implanted during the procedure      Assessment & Plan     Operative Findings: After informed consent was obtained the patient was taken back to the operating room placed under general esthesia prepped and draped in the sterile fashion for abdominal pelvic procedure in the supine position.  Abdominal exam under anesthesia revealed a large mass extending to above the umbilicus.  Incision was made in the midline extending from above the umbilicus down to the pubic sepsis carried out sharply the fascia the fascia was incised and the peritoneal cavity was then entered.  An abdominal evaluation revealed a large right ovarian mass on a twisted pedicle consistent with ovarian torsion.  Washings were obtained.  There is no other intra-abdominal abnormalities with palpation of the upper abdomen omentum and bowel.  The mass appeared to be not adherent to any other structures other than some mild inflammatory changes associated with the black ovary and the pelvic sidewall.  The mass was then elevated to the pedicle clamped cut and secured.  With some manipulation was able to be removed from the abdominal cavity.  It was then opened off the operative site closely inspected and revealed old hemorrhagic blood  with what appeared to be significant necrosis without any obvious excrescence or lesions concerning for malignancy.  At this point the mass was then sent to pathology for permanent report.  The vocal retractor was put in position.  2 Tere clamps were then used for uterine manipulation.  The bilateral round ligaments were clamped cut and secured the bilateral retroperitoneal spaces were explored and the ureters were identified.  With the ureters identified the IP's were isolated clamped and secured with Vicryl ties.  Attention is then drawn to the bladder flap and the serosa of the bladder reflection was elevated incised and the bladder sharply dissected off the underlying cervix down to level below the uterosacral ligaments.  The bilateral uterine arteries were then clamped cut and secured with Vicryl stitches.  The parametrial tissue serially clamped and secured with Vicryl stitches down to the level uterosacral ligaments.  The vagina was instructed and circumscribed long move of the uterus cervix and residual tube and ovary.  Copious amounts of irrigation were then performed.  The vaginal cuff was then closed with interrupted figure-of-eight's of 0 Vicryl.  Close inspection of the retroperitoneum the ureters and the vaginal cuff revealed good hemostasis and the bilateral ureters were peristalsing without any evidence of hydronephrosis.  The large bowel out of the pelvis first followed by the small bowel supertight from the abdominal incision with the omentum.  The fascia was then reapproximated in 2 loop EDSS starting to corner and meeting in the midline.  Few areas of disease external the Bovie coagulator.  Skin edges reapproximated with a 3-0 Monocryl and the wound was then further secured with skin glue.  This concluded the case.  Final instrument needle and sponge count were correct patient tolerated the procedure well extubated in the operating room transported to the PACU the to the postoperative floor for  the remainder of her hospital stay.             Chivo Bautista MD  24  13:45 EDT      Electronically signed by Chivo Bautista MD at 24 1524          Physician Progress Notes (last 24 hours)        Sanjuanita Orlando DO at 24 1609              [facility] Medicine Services  PROGRESS NOTE    Patient Name: Yolande Acosta  : 1955  MRN: [id number]    Date of Admission: 2024  Primary Care Physician: Farrah Pandey DO    Subjective   Subjective     CC:  Adnexal Mass     HPI:  Evaluated patient this AM. Reporting lower abdominal tightness, no nausea or vomiting. Been present in room, discussed Lann with patient and .      Objective   Objective     Vital Signs:   Temp:  [98.1 °F (36.7 °C)-99.3 °F (37.4 °C)] 99.3 °F (37.4 °C)  Heart Rate:  [65-83] 78  Resp:  [10-16] 15  BP: ()/(52-75) 132/64  Flow (L/min):  [2-3] 2     Physical Exam:  Constitutional: Awake, alert, resting comfortably  HENT: NCAT, mucous membranes moist  Respiratory: Clear to auscultation bilaterally, respiratory effort normal   Cardiovascular: RRR, no murmurs, rubs, or gallops  Gastrointestinal: soft, nontender, nondistended, lower abdomen firm to palpation  Musculoskeletal: No bilateral ankle edema  Psychiatric: Appropriate affect, cooperative  Neurologic: Alert and oriented x 3, no focal deficits, speech clear  Skin: No rashes      Results Reviewed:  LAB RESULTS:      Lab 24  0500 24  2211 24  1150   WBC 11.66* 14.23*  --    HEMOGLOBIN 11.5* 12.8  --    HEMATOCRIT 36.0 39.6  --    PLATELETS 254 269  --    NEUTROS ABS 9.15* 10.70*  --    IMMATURE GRANS (ABS) 0.04 0.03  --    LYMPHS ABS 1.35 2.24  --    MONOS ABS 1.01* 1.19*  --    EOS ABS 0.06 0.03  --    MCV 84.9 84.8  --    PROCALCITONIN  --   --  <0.14         Lab 24  0500 24  2211   SODIUM 138 140   POTASSIUM 4.1 4.4   CHLORIDE 105 105   CO2 24.0 24.0   ANION GAP 9.0 11.0   BUN 10 8    CREATININE 0.80 0.85   EGFR 79.4 73.8   GLUCOSE 135* 100*   CALCIUM 8.3* 9.1         Lab 07/30/24  2211 07/30/24  1150   TOTAL PROTEIN 7.2  --    ALBUMIN 3.7  --    GLOBULIN 3.5  --    ALT (SGPT) 21  --    AST (SGOT) 29  --    BILIRUBIN 0.8  --    ALK PHOS 100  --    AMYLASE  --  78   LIPASE  --  28                 Lab 07/31/24  1227   ABO TYPING O   RH TYPING Positive   ANTIBODY SCREEN Negative         Brief Urine Lab Results       None            Microbiology Results Abnormal       None            Peripheral Block    Result Date: 7/31/2024  Lacey Grant, CRNA     7/31/2024  2:03 PM Peripheral Block Pre-sedation assessment completed: 7/31/2024 1:34 PM Patient reassessed immediately prior to procedure Patient location during procedure: OR Start time: 7/31/2024 1:45 PM Stop time: 7/31/2024 1:49 PM Reason for block: at surgeon's request and post-op pain management Performed by Anesthesiologist: Mickey Weiss MD Preanesthetic Checklist Completed: patient identified, IV checked, site marked, risks and benefits discussed, surgical consent, monitors and equipment checked, pre-op evaluation and timeout performed Prep: Pt Position: supine Sterile barriers:cap, gloves, mask and washed/disinfected hands Prep: ChloraPrep Patient monitoring: blood pressure monitoring, continuous pulse oximetry and EKG Procedure Sedation: yes Performed under: general Guidance:ultrasound guided Images:still images obtained, printed/placed on chart Laterality:Bilateral Block Type:TAP Injection Technique:single-shot Needle Type:short-bevel and echogenic Needle Gauge:20 G Resistance on Injection: none Medications Used: dexamethasone sodium phosphate injection - Injection  4 mg - 7/31/2024 1:49:00 PM bupivacaine PF (MARCAINE) 0.25 % injection - Injection  60 mL - 7/31/2024 1:49:00 PM Medications Comment:Block Injection:  LA dose divided between Right and Left block Post Assessment Injection Assessment: negative aspiration for heme,  "incremental injection and no paresthesia on injection Patient Tolerance:comfortable throughout block Complications:no Additional Notes Subcostal TAPs A high-frequency linear transducer, with sterile cover, was placed sub-xiphoid to identify Line[place] Alba, right and left Rectus Abdominus Muscles (HAILEY). The transducer was moved either right or left subcostally to identify the HAILEY and the Transverse Abdominus Muscle (MAE). The insertion site was prepped in sterile fashion and then localized with 2-5 ml of 1% Lidocaine. Using ultrasound-guidance, a 20-gauge B-Myers 4\" Ultraplex 360 non-stimulating echogenic needle was advanced in plane, from medial to lateral, until the tip of the needle was in the fascial plane between the HAILEY and MAE. 1-3ml of preservative free normal saline was used to hydro-dissect the fascial planes. After the fascial plane was verified, the local anesthetic (LA) was injected. The procedure was repeated on the opposite side for bilateral coverage. Aspiration every 5 ml to prevent intravascular injection. Injection was completed with negative aspiration of blood and negative intravascular injection. Injection pressures were normal with minimal resistance. The subcostal approach to the TAP nerve block ideally anesthetizes the intercostal nerves T6-T9. Mid-Axillary/Lateral TAPs A high-frequency linear transducer, with sterile cover, was placed in the midaxillary line between the ASIS and costal margin. The External Oblique Muscle (EOM), Internal Oblique Muscle (IOM), Transverse Abdominus Muscle (MAE), and Peritoneum were identified. The insertion site was prepped in sterile fashion and then localized with 2-5 ml of 1% Lidocaine. Using ultrasound-guidance, a 20-gauge B-Myers 4\" Ultraplex 360 non-stimulating echogenic needle was advanced in plane, from medial to lateral, until the tip of the needle was in the fascial plane between the IOM and MAE. 1-3ml of preservative free normal saline was used to " hydro-dissect the fascial planes. After the fascial plane was verified, the local anesthetic (LA) was injected. The procedure was repeated on the opposite side for bilateral coverage. Aspiration every 5 ml to prevent intravascular injection. Injection was completed with negative aspiration of blood and negative intravascular injection. Injection pressures were normal with minimal resistance. Midaxillary TAPs should reach intercostal nerves T10- T11 and the subcostal nerve T12.  Performed by: Lacey Grant CRNA     US non-ob transvaginal    Result Date: 7/30/2024  PELVIC ULTRASOUND HISTORY: Pelvic pain. PROCEDURE: Ultrasound images of the pelvis were obtained. FINDINGS: The uterus measures 8 x 5 cm. The endometrium is  normal at 3 mm. The right ovary demonstrates a 12 cm cystic mass. Mural complex  lesions are noted. The left ovary  is not identified.  There is no significant free fluid .    Impression: Large right adnexal mass. 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 Images reviewed, interpreted, and dictated by JIM Elder MD    CT Abdomen Pelvis With Contrast    Result Date: 7/30/2024  CT SCAN OF THE ABDOMEN AND PELVIS WITH CONTRAST    7/30/2024 12:31 PM HISTORY: Acute lower abdominal pain and tenderness. COMPARISON: None. PROCEDURE: The patient was injected with IV contrast. Axial images were obtained from the lung bases to the pubic symphysis by computed tomography. This study was performed with techniques to keep radiation doses as low as reasonably achievable, (ALARA). Individualized dose reduction techniques using automated exposure control or adjustment of mA and/or kV according to the patient size were employed. FINDINGS: ABDOMEN: The lung bases are clear. The heart is proper size. There is a small hiatal hernia. The liver is homogenous with no focal abnormality. There are gallstones within the gallbladder. The spleen is unremarkable. No adrenal mass is present.  The pancreas is  unremarkable. The kidneys demonstrate parapelvic cysts bilaterally. There is a right renal stone measuring 5 mm. There are multiple left renal stones measuring up to 20 mm. The aorta is proper caliber. There is no free fluid or adenopathy. PELVIS: There is sigmoid diverticulosis throughout the transverse colon. The GI tract demonstrates no obstruction.  The appendix is not identified. The urinary bladder is unremarkable. There is a cystic mass measuring up to 16 cm with multiple mural complex cysts measuring up to 4 cm. The uterus is deviated to the left.    Impression: Bilateral nephrolithiasis. Large cystic mass in the pelvis which extends into the abdomen. This likely represents ovarian neoplasm. Images reviewed, interpreted, and dictated by Dr. JIM Elder. Transcribed by Patricia Montenegro PA-C.         Current medications:  Scheduled Meds:[Held by provider] aspirin, 81 mg, Oral, Daily  [Held by provider] lisinopril, 10 mg, Oral, Daily  [START ON 8/1/2024] rosuvastatin, 20 mg, Oral, Nightly  sodium chloride, 10 mL, Intravenous, Q12H  sodium chloride, 3 mL, Intravenous, Q12H      Continuous Infusions:lactated ringers, 9 mL/hr, Last Rate: 9 mL/hr (07/31/24 1327)  sodium chloride, 75 mL/hr, Last Rate: 75 mL/hr (07/31/24 0633)      PRN Meds:.  acetaminophen **OR** acetaminophen **OR** acetaminophen    droperidol **OR** droperidol    fentanyl    hydrALAZINE    HYDROcodone-acetaminophen    HYDROmorphone    ipratropium-albuterol    labetalol    lactated ringers    melatonin    meperidine    naloxone    nitroglycerin    ondansetron ODT **OR** ondansetron    ondansetron    promethazine **OR** promethazine    sodium chloride    sodium chloride    sodium chloride    sodium chloride    Assessment & Plan   Assessment & Plan     Active Hospital Problems    Diagnosis  POA    **Adnexal mass [N94.89]  Yes    Essential hypertension [I10]  Yes    Mixed hyperlipidemia [E78.2]  Yes    Left renal stone [N20.0]  Yes     Pelvic mass in female [R19.00]  Unknown      Resolved Hospital Problems   No resolved problems to display.        Brief Hospital Course to date:  Yolande Acosta is a 68 y.o. female with PMHx of HTN, and HLD who was transferred here from OSH for higher level of care rearding the finding of a new adnexal mass. Patient initially presented to [facility] ED with a week of LLQ abdominal pain. Imaging at OSH showed the presence of a new large left adnexal mass.     This patient's problems and plans were partially entered by my partner and updated as appropriate by me 07/31/24.    Large pelvic mass  -Gyn Onc suspecting possibility of ovarian torsion prompting patient's pain, however malignancy still within differential.  -Pelvic US as OSH showed the presence of a new 12cm left adnexal mass.   -GYN Onc evaluated, performed exploratory laparotomy with JACK/BSO and tumor debulking on 7/31.  -Continue gentle hydration with fluids. Follow final pathology. Pain control with PRN Norco, IV Dilaudid. PRN Zofran for nausea.     Mild leukocytosis  -Likely reactive secondary to above  -Afebrile but with low-grade temp 99  -Monitor closely off of antibiotics for now.     Bilateral nephrolithiasis  -Imaging at OSH showed right renal stone 5 mm, multiple left renal stones measuring up to 20 mm.  -May need follow-up with urology on discharge.     Hypertension  -Holding lisinopril. Add back as appropriate.    Hyperlipidemia  -Continue rosuvastatin.    Expected Discharge Location and Transportation: Home  Expected Discharge   Expected Discharge Date: 8/2/2024; Expected Discharge Time:      VTE Prophylaxis:  Mechanical VTE prophylaxis orders are signed & held. Mechanical VTE prophylaxis orders are present.         AM-PAC 6 Clicks Score (PT): 24 (07/30/24 2110)    CODE STATUS:   Code Status and Medical Interventions: CPR (Attempt to Resuscitate); Full Support   Ordered at: 07/30/24 5418     Code Status (Patient has no pulse and is not  breathing):    CPR (Attempt to Resuscitate)     Medical Interventions (Patient has pulse or is breathing):    Full Support       Sanjuanita Orlando DO  24       Electronically signed by Sanjuanita Orlando DO at 24 1623          Consult Notes (last 24 hours)        Chivo Bautista MD at 24 1010        Consult Orders    1. Inpatient Gynecologic Oncology Consult [[id number]] ordered by Missael Vidal PA-C at 24 2229                   [facility]   Consult Note    Patient Name: Yolande Acosta  : 1955  MRN: [id number]  Primary Care Physician:  Farrah Pandey DO  Referring Physician: No Known Provider  Date of admission: 2024    Subjective   Subjective     Reason for Consult/ Chief Complaint: Pelvic mass    HPI:  Yolande Acosta is a 68 y.o. female patient was in her normal healthy state without issues or complaints and initiated exercise when she developed sudden onset of severe left lower quadrant pain.  This occurred approximately 7 days ago.  Patient states the pain got slightly better but approximately 48 hours ago developed severe pain with difficult moving in the same location.  Patient states that she is still having normal bowel movements.  Patient denies any fever nausea vomiting or other complaints.  Patient describes her pain without her current pain medication at 10 out of 10.  With pain medication relatively controlled at 4 out of 10.  Patient able to ambulate though pain increases with activity.  Patient was seen in the emergency room and underwent CT scan 2024 that showed bilateral nephrolithiasis and a large cystic mass measuring 16 cm.  Labs showed elevated white count of 14.2, hemoglobin 12.8 and normal platelets.  Patient's creatinine is also normal.    Review of Systems   All systems were reviewed and negative except for: As discussed above    Personal History     Past Medical History:   Diagnosis Date    Elevated  cholesterol     Hypertension        History reviewed. No pertinent surgical history.    Family History: family history includes Breast cancer in her mother. Otherwise pertinent FHx was reviewed and not pertinent to current issue.    Social History:  reports that she has never smoked. She has never been exposed to tobacco smoke. She has never used smokeless tobacco. She reports current alcohol use. She reports that she does not use drugs.    Home Medications:  Multi-Vitamin/Fluoride/Iron, Vitamin D (Cholecalciferol), aspirin, lisinopril, and rosuvastatin    Allergies:  No Known Allergies    Objective    Objective     Vitals:   Temp:  [98.1 °F (36.7 °C)-98.8 °F (37.1 °C)] (P) 98.8 °F (37.1 °C)  Heart Rate:  [67-83] 67  Resp:  [16] (P) 16  BP: ()/(52-75) 99/75    Physical Exam:   Constitutional: Awake, alert   Eyes: no conjunctival injection   HENT: NCAT, mucous membranes moist   Neck: Supple, no thyromegaly, no lymphadenopathy, trachea midline   Respiratory: Clear to auscultation bilaterally, nonlabored respirations    Cardiovascular: RRR, no murmurs, rubs, or gallops, palpable pedal pulses bilaterally   Gastrointestinal: Positive bowel sounds, abdomen tender with moderate guarding without rebound.  Palpable mass extending to above umbilicus.   Musculoskeletal: No bilateral ankle edema, no clubbing or cyanosis to extremities   Psychiatric: Appropriate affect, cooperative   Neurologic: Oriented x 3, strength symmetric in all extremities, Cranial Nerves grossly intact to confrontation, speech clear   Skin: No rashes     Result Review    Result Review:  I have personally reviewed the results from the time of this admission to 7/31/2024 10:16 EDT and agree with these findings:  [x]  Laboratory list / accordion  []  Microbiology  [x]  Radiology  []  EKG/Telemetry   []  Cardiology/Vascular   []  Pathology  []  Old records  []  Other:  Most notable findings include: Mass as discussed above      Assessment & Plan    Assessment / Plan     Brief Patient Summary:  Yolande Acosta is a 68 y.o. female who found to have a large adnexal/pelvic mass.    Active Hospital Problems:  Active Hospital Problems    Diagnosis     **Adnexal mass     Essential hypertension     Mixed hyperlipidemia     Left renal stone     Pelvic mass in female      Plan:  I discussed both malignant and nonmalignant causes of pelvic masses.  Discussed the options but due to the patient's significant pain recommend proceeding forward with surgery.  Also discussed in detail the options at the time of surgery depending on operative findings.  I also discussed that this likely represents a torsion.  After this discussion the patient is opted proceed forward with surgery.  Due to the size I recommend open surgery to include removal of uterus cervix bilateral tubes ovaries and associated mass with interoperative assessment for malignancy including possible frozen section.  I discussed the limitations of interoperative assessment and frozen section.  If malignancy is discovered during the surgery then proceed forward with staging.  I discussed the possibility of visible tumor requiring cytoreductive surgery including removal of omentum lymph nodes peritoneal biopsies bowel resection with possible colostomy.  Discussed all also the possibility of removal or biopsying other organs.  I discussed the risks of surgery including risk of bleeding possible requiring blood transfusion the risk of infection possible requiring prolonged antibiotics risk of damage to other organs including the bowel bladder ureters nerves and vessels, also discussed the risk of hernia formation, continued pain, prolonged hospitalization, risk to heart lungs including pulmonary emboli.  Lastly discussed the very rare risk of death.  After this discussion all questions were answered.      Chivo Bautista MD      Electronically signed by Chivo Bautista MD at 07/31/24 1888

## 2024-07-31 NOTE — CONSULTS
[facility]   Consult Note    Patient Name: Yolande Acosta  : 1955  MRN: [id number]  Primary Care Physician:  Farrah Pandey DO  Referring Physician: No Known Provider  Date of admission: 2024    Subjective   Subjective     Reason for Consult/ Chief Complaint: Pelvic mass    HPI:  Yolande Acosta is a 68 y.o. female patient was in her normal healthy state without issues or complaints and initiated exercise when she developed sudden onset of severe left lower quadrant pain.  This occurred approximately 7 days ago.  Patient states the pain got slightly better but approximately 48 hours ago developed severe pain with difficult moving in the same location.  Patient states that she is still having normal bowel movements.  Patient denies any fever nausea vomiting or other complaints.  Patient describes her pain without her current pain medication at 10 out of 10.  With pain medication relatively controlled at 4 out of 10.  Patient able to ambulate though pain increases with activity.  Patient was seen in the emergency room and underwent CT scan 2024 that showed bilateral nephrolithiasis and a large cystic mass measuring 16 cm.  Labs showed elevated white count of 14.2, hemoglobin 12.8 and normal platelets.  Patient's creatinine is also normal.    Review of Systems   All systems were reviewed and negative except for: As discussed above    Personal History     Past Medical History:   Diagnosis Date    Elevated cholesterol     Hypertension        History reviewed. No pertinent surgical history.    Family History: family history includes Breast cancer in her mother. Otherwise pertinent FHx was reviewed and not pertinent to current issue.    Social History:  reports that she has never smoked. She has never been exposed to tobacco smoke. She has never used smokeless tobacco. She reports current alcohol use. She reports that she does not use drugs.    Home  Medications:  Multi-Vitamin/Fluoride/Iron, Vitamin D (Cholecalciferol), aspirin, lisinopril, and rosuvastatin    Allergies:  No Known Allergies    Objective    Objective     Vitals:   Temp:  [98.1 °F (36.7 °C)-98.8 °F (37.1 °C)] (P) 98.8 °F (37.1 °C)  Heart Rate:  [67-83] 67  Resp:  [16] (P) 16  BP: ()/(52-75) 99/75    Physical Exam:   Constitutional: Awake, alert   Eyes: no conjunctival injection   HENT: NCAT, mucous membranes moist   Neck: Supple, no thyromegaly, no lymphadenopathy, trachea midline   Respiratory: Clear to auscultation bilaterally, nonlabored respirations    Cardiovascular: RRR, no murmurs, rubs, or gallops, palpable pedal pulses bilaterally   Gastrointestinal: Positive bowel sounds, abdomen tender with moderate guarding without rebound.  Palpable mass extending to above umbilicus.   Musculoskeletal: No bilateral ankle edema, no clubbing or cyanosis to extremities   Psychiatric: Appropriate affect, cooperative   Neurologic: Oriented x 3, strength symmetric in all extremities, Cranial Nerves grossly intact to confrontation, speech clear   Skin: No rashes     Result Review    Result Review:  I have personally reviewed the results from the time of this admission to 7/31/2024 10:16 EDT and agree with these findings:  [x]  Laboratory list / accordion  []  Microbiology  [x]  Radiology  []  EKG/Telemetry   []  Cardiology/Vascular   []  Pathology  []  Old records  []  Other:  Most notable findings include: Mass as discussed above      Assessment & Plan   Assessment / Plan     Brief Patient Summary:  Yolande Acosta is a 68 y.o. female who found to have a large adnexal/pelvic mass.    Active Hospital Problems:  Active Hospital Problems    Diagnosis     **Adnexal mass     Essential hypertension     Mixed hyperlipidemia     Left renal stone     Pelvic mass in female      Plan:  I discussed both malignant and nonmalignant causes of pelvic masses.  Discussed the options but due to the patient's  significant pain recommend proceeding forward with surgery.  Also discussed in detail the options at the time of surgery depending on operative findings.  I also discussed that this likely represents a torsion.  After this discussion the patient is opted proceed forward with surgery.  Due to the size I recommend open surgery to include removal of uterus cervix bilateral tubes ovaries and associated mass with interoperative assessment for malignancy including possible frozen section.  I discussed the limitations of interoperative assessment and frozen section.  If malignancy is discovered during the surgery then proceed forward with staging.  I discussed the possibility of visible tumor requiring cytoreductive surgery including removal of omentum lymph nodes peritoneal biopsies bowel resection with possible colostomy.  Discussed all also the possibility of removal or biopsying other organs.  I discussed the risks of surgery including risk of bleeding possible requiring blood transfusion the risk of infection possible requiring prolonged antibiotics risk of damage to other organs including the bowel bladder ureters nerves and vessels, also discussed the risk of hernia formation, continued pain, prolonged hospitalization, risk to heart lungs including pulmonary emboli.  Lastly discussed the very rare risk of death.  After this discussion all questions were answered.      Chivo Bautista MD

## 2024-08-01 LAB
ANION GAP SERPL CALCULATED.3IONS-SCNC: 13 MMOL/L (ref 5–15)
BASOPHILS # BLD AUTO: 0.01 10*3/MM3 (ref 0–0.2)
BASOPHILS NFR BLD AUTO: 0.1 % (ref 0–1.5)
BUN SERPL-MCNC: 9 MG/DL (ref 8–23)
BUN/CREAT SERPL: 13.8 (ref 7–25)
CALCIUM SPEC-SCNC: 8.5 MG/DL (ref 8.6–10.5)
CHLORIDE SERPL-SCNC: 104 MMOL/L (ref 98–107)
CO2 SERPL-SCNC: 20 MMOL/L (ref 22–29)
CREAT SERPL-MCNC: 0.65 MG/DL (ref 0.57–1)
DEPRECATED RDW RBC AUTO: 42.5 FL (ref 37–54)
EGFRCR SERPLBLD CKD-EPI 2021: 94.9 ML/MIN/1.73
EOSINOPHIL # BLD AUTO: 0 10*3/MM3 (ref 0–0.4)
EOSINOPHIL NFR BLD AUTO: 0 % (ref 0.3–6.2)
ERYTHROCYTE [DISTWIDTH] IN BLOOD BY AUTOMATED COUNT: 14 % (ref 12.3–15.4)
GLUCOSE SERPL-MCNC: 148 MG/DL (ref 65–99)
HCT VFR BLD AUTO: 34.7 % (ref 34–46.6)
HGB BLD-MCNC: 11.2 G/DL (ref 12–15.9)
IMM GRANULOCYTES # BLD AUTO: 0.07 10*3/MM3 (ref 0–0.05)
IMM GRANULOCYTES NFR BLD AUTO: 0.5 % (ref 0–0.5)
LYMPHOCYTES # BLD AUTO: 0.58 10*3/MM3 (ref 0.7–3.1)
LYMPHOCYTES NFR BLD AUTO: 4 % (ref 19.6–45.3)
MCH RBC QN AUTO: 26.9 PG (ref 26.6–33)
MCHC RBC AUTO-ENTMCNC: 32.3 G/DL (ref 31.5–35.7)
MCV RBC AUTO: 83.2 FL (ref 79–97)
MONOCYTES # BLD AUTO: 0.45 10*3/MM3 (ref 0.1–0.9)
MONOCYTES NFR BLD AUTO: 3.1 % (ref 5–12)
NEUTROPHILS NFR BLD AUTO: 13.49 10*3/MM3 (ref 1.7–7)
NEUTROPHILS NFR BLD AUTO: 92.3 % (ref 42.7–76)
NRBC BLD AUTO-RTO: 0 /100 WBC (ref 0–0.2)
PLATELET # BLD AUTO: 279 10*3/MM3 (ref 140–450)
PMV BLD AUTO: 10.5 FL (ref 6–12)
POTASSIUM SERPL-SCNC: 4.4 MMOL/L (ref 3.5–5.2)
RBC # BLD AUTO: 4.17 10*6/MM3 (ref 3.77–5.28)
SODIUM SERPL-SCNC: 137 MMOL/L (ref 136–145)
WBC NRBC COR # BLD AUTO: 14.6 10*3/MM3 (ref 3.4–10.8)

## 2024-08-01 PROCEDURE — 85025 COMPLETE CBC W/AUTO DIFF WBC: CPT | Performed by: PHYSICIAN ASSISTANT

## 2024-08-01 PROCEDURE — 25010000002 HEPARIN (PORCINE) PER 1000 UNITS: Performed by: OBSTETRICS & GYNECOLOGY

## 2024-08-01 PROCEDURE — 99232 SBSQ HOSP IP/OBS MODERATE 35: CPT | Performed by: INTERNAL MEDICINE

## 2024-08-01 PROCEDURE — 80048 BASIC METABOLIC PNL TOTAL CA: CPT | Performed by: PHYSICIAN ASSISTANT

## 2024-08-01 PROCEDURE — 25810000003 LACTATED RINGERS PER 1000 ML: Performed by: OBSTETRICS & GYNECOLOGY

## 2024-08-01 RX ORDER — HEPARIN SODIUM 5000 [USP'U]/ML
5000 INJECTION, SOLUTION INTRAVENOUS; SUBCUTANEOUS EVERY 8 HOURS SCHEDULED
Status: DISCONTINUED | OUTPATIENT
Start: 2024-08-01 | End: 2024-08-03 | Stop reason: HOSPADM

## 2024-08-01 RX ADMIN — HEPARIN SODIUM 5000 UNITS: 5000 INJECTION INTRAVENOUS; SUBCUTANEOUS at 14:05

## 2024-08-01 RX ADMIN — SODIUM CHLORIDE, POTASSIUM CHLORIDE, SODIUM LACTATE AND CALCIUM CHLORIDE 75 ML/HR: 600; 310; 30; 20 INJECTION, SOLUTION INTRAVENOUS at 05:31

## 2024-08-01 RX ADMIN — ACETAMINOPHEN 650 MG: 325 TABLET ORAL at 12:53

## 2024-08-01 RX ADMIN — OXYCODONE HYDROCHLORIDE 5 MG: 5 TABLET ORAL at 17:54

## 2024-08-01 RX ADMIN — HEPARIN SODIUM 5000 UNITS: 5000 INJECTION INTRAVENOUS; SUBCUTANEOUS at 20:37

## 2024-08-01 RX ADMIN — ROSUVASTATIN CALCIUM 20 MG: 20 TABLET, COATED ORAL at 20:37

## 2024-08-01 RX ADMIN — ACETAMINOPHEN 650 MG: 325 TABLET ORAL at 05:31

## 2024-08-01 RX ADMIN — OXYCODONE HYDROCHLORIDE 10 MG: 10 TABLET ORAL at 09:30

## 2024-08-01 RX ADMIN — Medication 10 ML: at 20:39

## 2024-08-01 RX ADMIN — ACETAMINOPHEN 650 MG: 325 TABLET ORAL at 23:49

## 2024-08-01 RX ADMIN — SODIUM CHLORIDE, POTASSIUM CHLORIDE, SODIUM LACTATE AND CALCIUM CHLORIDE 75 ML/HR: 600; 310; 30; 20 INJECTION, SOLUTION INTRAVENOUS at 19:17

## 2024-08-01 RX ADMIN — ACETAMINOPHEN 650 MG: 325 TABLET ORAL at 20:35

## 2024-08-01 NOTE — PROGRESS NOTES
[facility] Medicine Services  PROGRESS NOTE    Patient Name: Yolande Acosta  : 1955  MRN: [id number]    Date of Admission: 2024  Primary Care Physician: Farrah Pandey DO    Subjective   Subjective     CC:  Adnexal Mass     HPI:  Tolerating liquid diet.  Agreeable to ambulate.      Objective   Objective     Vital Signs:   Temp:  [96.4 °F (35.8 °C)-99.3 °F (37.4 °C)] 98.1 °F (36.7 °C)  Heart Rate:  [56-81] 69  Resp:  [10-18] 16  BP: (118-148)/(58-93) 121/93  Flow (L/min):  [2-3] 2     Physical Exam:  NAD, in bed  MM moist  RRR  CTAB  Abd soft, NT, BS+, incision with clean bandage  Alert, speech clear  Normal affect      Results Reviewed:  LAB RESULTS:      Lab 24  1150   WBC 14.60* 11.66* 14.23*  --    HEMOGLOBIN 11.2* 11.5* 12.8  --    HEMATOCRIT 34.7 36.0 39.6  --    PLATELETS 279 254 269  --    NEUTROS ABS 13.49* 9.15* 10.70*  --    IMMATURE GRANS (ABS) 0.07* 0.04 0.03  --    LYMPHS ABS 0.58* 1.35 2.24  --    MONOS ABS 0.45 1.01* 1.19*  --    EOS ABS 0.00 0.06 0.03  --    MCV 83.2 84.9 84.8  --    PROCALCITONIN  --   --   --  <0.14         Lab 24  05024  221   SODIUM 137 138 140   POTASSIUM 4.4 4.1 4.4   CHLORIDE 104 105 105   CO2 20.0* 24.0 24.0   ANION GAP 13.0 9.0 11.0   BUN 9 10 8   CREATININE 0.65 0.80 0.85   EGFR 94.9 79.4 73.8   GLUCOSE 148* 135* 100*   CALCIUM 8.5* 8.3* 9.1         Lab 24  1150   TOTAL PROTEIN 7.2  --    ALBUMIN 3.7  --    GLOBULIN 3.5  --    ALT (SGPT) 21  --    AST (SGOT) 29  --    BILIRUBIN 0.8  --    ALK PHOS 100  --    AMYLASE  --  78   LIPASE  --  28                 Lab 24  1227   ABO TYPING O   RH TYPING Positive   ANTIBODY SCREEN Negative         Brief Urine Lab Results       None            Microbiology Results Abnormal       None            Peripheral Block    Result Date: 2024  Lacey Grant CRNA     2024   "2:03 PM Peripheral Block Pre-sedation assessment completed: 7/31/2024 1:34 PM Patient reassessed immediately prior to procedure Patient location during procedure: OR Start time: 7/31/2024 1:45 PM Stop time: 7/31/2024 1:49 PM Reason for block: at surgeon's request and post-op pain management Performed by Anesthesiologist: Mickey Weiss MD Preanesthetic Checklist Completed: patient identified, IV checked, site marked, risks and benefits discussed, surgical consent, monitors and equipment checked, pre-op evaluation and timeout performed Prep: Pt Position: supine Sterile barriers:cap, gloves, mask and washed/disinfected hands Prep: ChloraPrep Patient monitoring: blood pressure monitoring, continuous pulse oximetry and EKG Procedure Sedation: yes Performed under: general Guidance:ultrasound guided Images:still images obtained, printed/placed on chart Laterality:Bilateral Block Type:TAP Injection Technique:single-shot Needle Type:short-bevel and echogenic Needle Gauge:20 G Resistance on Injection: none Medications Used: dexamethasone sodium phosphate injection - Injection  4 mg - 7/31/2024 1:49:00 PM bupivacaine PF (MARCAINE) 0.25 % injection - Injection  60 mL - 7/31/2024 1:49:00 PM Medications Comment:Block Injection:  LA dose divided between Right and Left block Post Assessment Injection Assessment: negative aspiration for heme, incremental injection and no paresthesia on injection Patient Tolerance:comfortable throughout block Complications:no Additional Notes Subcostal TAPs A high-frequency linear transducer, with sterile cover, was placed sub-xiphoid to identify Linea Alba, right and left Rectus Abdominus Muscles (HAILEY). The transducer was moved either right or left subcostally to identify the HAILEY and the Transverse Abdominus Muscle (MAE). The insertion site was prepped in sterile fashion and then localized with 2-5 ml of 1% Lidocaine. Using ultrasound-guidance, a 20-gauge B-Myers 4\" Ultraplex 360 " "non-stimulating echogenic needle was advanced in plane, from medial to lateral, until the tip of the needle was in the fascial plane between the HAILEY and MAE. 1-3ml of preservative free normal saline was used to hydro-dissect the fascial planes. After the fascial plane was verified, the local anesthetic (LA) was injected. The procedure was repeated on the opposite side for bilateral coverage. Aspiration every 5 ml to prevent intravascular injection. Injection was completed with negative aspiration of blood and negative intravascular injection. Injection pressures were normal with minimal resistance. The subcostal approach to the TAP nerve block ideally anesthetizes the intercostal nerves T6-T9. Mid-Axillary/Lateral TAPs A high-frequency linear transducer, with sterile cover, was placed in the midaxillary line between the ASIS and costal margin. The External Oblique Muscle (EOM), Internal Oblique Muscle (IOM), Transverse Abdominus Muscle (MAE), and Peritoneum were identified. The insertion site was prepped in sterile fashion and then localized with 2-5 ml of 1% Lidocaine. Using ultrasound-guidance, a 20-gauge B-Myers 4\" Ultraplex 360 non-stimulating echogenic needle was advanced in plane, from medial to lateral, until the tip of the needle was in the fascial plane between the IOM and MAE. 1-3ml of preservative free normal saline was used to hydro-dissect the fascial planes. After the fascial plane was verified, the local anesthetic (LA) was injected. The procedure was repeated on the opposite side for bilateral coverage. Aspiration every 5 ml to prevent intravascular injection. Injection was completed with negative aspiration of blood and negative intravascular injection. Injection pressures were normal with minimal resistance. Midaxillary TAPs should reach intercostal nerves T10- T11 and the subcostal nerve T12.  Performed by: Lacey Grant CRNA US non-ob transvaginal    Result Date: 7/30/2024  PELVIC " ULTRASOUND HISTORY: Pelvic pain. PROCEDURE: Ultrasound images of the pelvis were obtained. FINDINGS: The uterus measures 8 x 5 cm. The endometrium is  normal at 3 mm. The right ovary demonstrates a 12 cm cystic mass. Mural complex  lesions are noted. The left ovary  is not identified.  There is no significant free fluid .    Impression: Large right adnexal mass. 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 Images reviewed, interpreted, and dictated by JIM Elder MD         Current medications:  Scheduled Meds:acetaminophen, 650 mg, Oral, Q6H  [Held by provider] aspirin, 81 mg, Oral, Daily  famotidine, 20 mg, Oral, Once  heparin (porcine), 5,000 Units, Subcutaneous, Q8H  [Held by provider] lisinopril, 10 mg, Oral, Daily  rosuvastatin, 20 mg, Oral, Nightly  sodium chloride, 10 mL, Intravenous, Q12H      Continuous Infusions:lactated ringers, 9 mL/hr, Last Rate: 9 mL/hr (07/31/24 1327)  lactated ringers, 75 mL/hr, Last Rate: 75 mL/hr (08/01/24 0531)  sodium chloride, 75 mL/hr, Last Rate: 75 mL/hr (07/31/24 0633)      PRN Meds:.  acetaminophen **OR** acetaminophen **OR** acetaminophen    HYDROmorphone **AND** naloxone    HYDROmorphone **AND** [DISCONTINUED] naloxone    ibuprofen    melatonin    nitroglycerin    ondansetron ODT **OR** ondansetron    oxyCODONE    oxyCODONE    promethazine **OR** promethazine    sodium chloride    sodium chloride    Assessment & Plan   Assessment & Plan     Active Hospital Problems    Diagnosis  POA    **Adnexal mass [N94.89]  Yes    Essential hypertension [I10]  Yes    Mixed hyperlipidemia [E78.2]  Yes    Left renal stone [N20.0]  Yes    Pelvic mass in female [R19.00]  Unknown      Resolved Hospital Problems   No resolved problems to display.        Brief Hospital Course to date:  Yolande Acosta is a 68 y.o. female with PMHx of HTN, and HLD who was transferred here from OSH for higher level of care rearding the finding of a new adnexal mass. Patient initially presented to  [facility] ED with a week of LLQ abdominal pain. Imaging at OSH showed the presence of a new large left adnexal mass.     This patient's problems and plans were partially entered by my partner and updated as appropriate by me 08/01/24.    Large pelvic mass  -Gyn Onc suspecting possibility of ovarian torsion prompting patient's pain, however malignancy still within differential.  -Pelvic US as OSH showed the presence of a new 12cm left adnexal mass.   -POD 1 open hysterectomy and BSO   -abdominal binder  -mobilize  -advance diet as tolerated    Mild leukocytosis  -Likely reactive   -afebrile  - cbc am    Bilateral nephrolithiasis  -Imaging at OSH showed right renal stone 5 mm, multiple left renal stones measuring up to 20 mm.  -May need follow-up with urology on discharge.     Hypertension  -Holding lisinopril. Add back as appropriate.    Hyperlipidemia  -Continue rosuvastatin.    Expected Discharge Location and Transportation: Home  Expected Discharge   Expected Discharge Date: 8/2/2024; Expected Discharge Time:      VTE Prophylaxis:  Pharmacologic & mechanical VTE prophylaxis orders are present.         AM-PAC 6 Clicks Score (PT): 24 (07/31/24 2005)    CODE STATUS:   Code Status and Medical Interventions: CPR (Attempt to Resuscitate); Full Support   Ordered at: 07/31/24 3538     Code Status (Patient has no pulse and is not breathing):    CPR (Attempt to Resuscitate)     Medical Interventions (Patient has pulse or is breathing):    Full Support       Ilya Carreno MD  08/01/24

## 2024-08-01 NOTE — POST-PROCEDURE NOTE
"Assessment & Plan   Postop day 1 open hysterectomy and BSO for ovarian torsion and pelvic mass.  OR discussed in detail including operative findings  Plan advance diet and activity as tolerated.  MELIZA Martínez.  Initiate heparin prophylaxis.  Await pathology.  Subjective   Patient states she is having good pain control with oral Tylenol and only required 1 dose of narcotics last night.  Patient states that she is tolerating clears at this time but does not feel like advancing to regular diet yet.      Temp:  [96.4 °F (35.8 °C)-99.3 °F (37.4 °C)] 97.7 °F (36.5 °C)  Heart Rate:  [56-81] 56  Resp:  [10-18] 16  BP: ()/(52-75) 118/66  I/O last 3 completed shifts:  In: 1460 [I.V.:1360; IV Piggyback:100]  Out: 1450 [Urine:1350; Blood:100]  No intake/output data recorded.    Objective   Objective:  Vital signs (most recent): Blood pressure 118/66, pulse 56, temperature 97.7 °F (36.5 °C), temperature source Oral, resp. rate 16, height 160 cm (63\"), weight 71.6 kg (157 lb 12.8 oz), SpO2 92%, not currently breastfeeding.    Physical exam: Patient awake alert without any acute distress  Abdomen appropriately tender incision intact without any drainage    Labs reviewed and hemoglobin stable normal creatinine.  White count remains slightly elevated though stable compared to admission      Adnexal mass    Essential hypertension    Mixed hyperlipidemia    Left renal stone    Pelvic mass in female      "

## 2024-08-01 NOTE — PLAN OF CARE
Goal Outcome Evaluation: On RA. Sinus vika. Abdominal wrap utilized. Oxycodone given for pain prn. Up in chair during day. Continue plan of care.

## 2024-08-01 NOTE — CASE MANAGEMENT/SOCIAL WORK
Discharge Planning Assessment  [facility]     Patient Name: Yolande Acosta  MRN: [id number]  Today's Date: 8/1/2024    Admit Date: 7/30/2024    Plan: Home   Discharge Needs Assessment       Row Name 08/01/24 1433       Living Environment    People in Home alone    Current Living Arrangements home    Potentially Unsafe Housing Conditions none    Primary Care Provided by self       Transportation Needs    In the past 12 months, has lack of transportation kept you from medical appointments or from getting medications? no    In the past 12 months, has lack of transportation kept you from meetings, work, or from getting things needed for daily living? No       Transition Planning    Patient/Family Anticipated Services at Transition none       Discharge Needs Assessment    Equipment Currently Used at Home walker, standard;wheelchair    Equipment Needed After Discharge none                   Discharge Plan       Row Name 08/01/24 1433       Plan    Plan Home    Patient/Family in Agreement with Plan yes    Plan Comments Spoke with patient at bedside. Patient lives with  in [facility]. She is independent with ADLs. Patient has walker and wheelchair at home if needed. She is not current with home health services. PCP is Farrah Pandey. Insurance is [employer] Medicare Replacement. She gets her medication filled at [facility] on [address]. Patient discharge plan is home with private transport. CM will follow for any discharge needs.    Final Discharge Disposition Code 01 - home or self-care                  Continued Care and Services - Admitted Since 7/30/2024    No active coordination exists for this encounter.       Expected Discharge Date and Time       Expected Discharge Date Expected Discharge Time    Aug 2, 2024            Demographic Summary       Row Name 08/01/24 1433       General Information    Admission Type inpatient    Preferred Language English                   Functional Status       Row Name 08/01/24 1433        Functional Status    Usual Activity Tolerance good    Current Activity Tolerance good       Functional Status, IADL    Medications independent    Meal Preparation independent    Housekeeping independent    Laundry independent    Shopping independent                   Psychosocial    No documentation.                  Abuse/Neglect    No documentation.                  Legal    No documentation.                  Substance Abuse    No documentation.                  Patient Forms    No documentation.                     Joyce Calhoun RN

## 2024-08-01 NOTE — PLAN OF CARE
Goal Outcome Evaluation: NSR. On RA. Went for surgery today, midline incision. Martínez in place after procedure. Continue plan of care.

## 2024-08-02 LAB
ANION GAP SERPL CALCULATED.3IONS-SCNC: 9 MMOL/L (ref 5–15)
BASOPHILS # BLD AUTO: 0.03 10*3/MM3 (ref 0–0.2)
BASOPHILS NFR BLD AUTO: 0.3 % (ref 0–1.5)
BUN SERPL-MCNC: 8 MG/DL (ref 8–23)
BUN/CREAT SERPL: 12.1 (ref 7–25)
CALCIUM SPEC-SCNC: 8.4 MG/DL (ref 8.6–10.5)
CHLORIDE SERPL-SCNC: 107 MMOL/L (ref 98–107)
CO2 SERPL-SCNC: 25 MMOL/L (ref 22–29)
CREAT SERPL-MCNC: 0.66 MG/DL (ref 0.57–1)
CYTO UR: NORMAL
DEPRECATED RDW RBC AUTO: 43.1 FL (ref 37–54)
EGFRCR SERPLBLD CKD-EPI 2021: 94.5 ML/MIN/1.73
EOSINOPHIL # BLD AUTO: 0.03 10*3/MM3 (ref 0–0.4)
EOSINOPHIL NFR BLD AUTO: 0.3 % (ref 0.3–6.2)
ERYTHROCYTE [DISTWIDTH] IN BLOOD BY AUTOMATED COUNT: 14 % (ref 12.3–15.4)
GLUCOSE SERPL-MCNC: 100 MG/DL (ref 65–99)
HCT VFR BLD AUTO: 31.2 % (ref 34–46.6)
HGB BLD-MCNC: 10.1 G/DL (ref 12–15.9)
IMM GRANULOCYTES # BLD AUTO: 0.05 10*3/MM3 (ref 0–0.05)
IMM GRANULOCYTES NFR BLD AUTO: 0.4 % (ref 0–0.5)
LAB AP CASE REPORT: NORMAL
LAB AP CLINICAL INFORMATION: NORMAL
LAB AP DIAGNOSIS COMMENT: NORMAL
LYMPHOCYTES # BLD AUTO: 1.86 10*3/MM3 (ref 0.7–3.1)
LYMPHOCYTES NFR BLD AUTO: 15.6 % (ref 19.6–45.3)
MCH RBC QN AUTO: 27.2 PG (ref 26.6–33)
MCHC RBC AUTO-ENTMCNC: 32.4 G/DL (ref 31.5–35.7)
MCV RBC AUTO: 83.9 FL (ref 79–97)
MONOCYTES # BLD AUTO: 0.72 10*3/MM3 (ref 0.1–0.9)
MONOCYTES NFR BLD AUTO: 6 % (ref 5–12)
NEUTROPHILS NFR BLD AUTO: 77.4 % (ref 42.7–76)
NEUTROPHILS NFR BLD AUTO: 9.26 10*3/MM3 (ref 1.7–7)
NRBC BLD AUTO-RTO: 0 /100 WBC (ref 0–0.2)
PATH REPORT.FINAL DX SPEC: NORMAL
PATH REPORT.GROSS SPEC: NORMAL
PLATELET # BLD AUTO: 305 10*3/MM3 (ref 140–450)
PMV BLD AUTO: 10.9 FL (ref 6–12)
POTASSIUM SERPL-SCNC: 4.5 MMOL/L (ref 3.5–5.2)
RBC # BLD AUTO: 3.72 10*6/MM3 (ref 3.77–5.28)
SODIUM SERPL-SCNC: 141 MMOL/L (ref 136–145)
WBC NRBC COR # BLD AUTO: 11.95 10*3/MM3 (ref 3.4–10.8)

## 2024-08-02 PROCEDURE — 25810000003 LACTATED RINGERS PER 1000 ML: Performed by: OBSTETRICS & GYNECOLOGY

## 2024-08-02 PROCEDURE — 25010000002 HEPARIN (PORCINE) PER 1000 UNITS: Performed by: OBSTETRICS & GYNECOLOGY

## 2024-08-02 PROCEDURE — 99232 SBSQ HOSP IP/OBS MODERATE 35: CPT | Performed by: INTERNAL MEDICINE

## 2024-08-02 PROCEDURE — 80048 BASIC METABOLIC PNL TOTAL CA: CPT | Performed by: PHYSICIAN ASSISTANT

## 2024-08-02 PROCEDURE — 85025 COMPLETE CBC W/AUTO DIFF WBC: CPT | Performed by: PHYSICIAN ASSISTANT

## 2024-08-02 RX ORDER — CALCIUM CARBONATE 500 MG/1
2 TABLET, CHEWABLE ORAL 3 TIMES DAILY PRN
Status: DISCONTINUED | OUTPATIENT
Start: 2024-08-02 | End: 2024-08-03 | Stop reason: HOSPADM

## 2024-08-02 RX ORDER — FAMOTIDINE 20 MG/1
20 TABLET, FILM COATED ORAL
Status: DISCONTINUED | OUTPATIENT
Start: 2024-08-02 | End: 2024-08-03 | Stop reason: HOSPADM

## 2024-08-02 RX ADMIN — ACETAMINOPHEN 650 MG: 325 TABLET ORAL at 12:08

## 2024-08-02 RX ADMIN — FAMOTIDINE 20 MG: 20 TABLET, FILM COATED ORAL at 21:42

## 2024-08-02 RX ADMIN — ACETAMINOPHEN 650 MG: 325 TABLET ORAL at 18:00

## 2024-08-02 RX ADMIN — CALCIUM CARBONATE (ANTACID) CHEW TAB 500 MG 2 TABLET: 500 CHEW TAB at 21:42

## 2024-08-02 RX ADMIN — HEPARIN SODIUM 5000 UNITS: 5000 INJECTION INTRAVENOUS; SUBCUTANEOUS at 15:34

## 2024-08-02 RX ADMIN — HEPARIN SODIUM 5000 UNITS: 5000 INJECTION INTRAVENOUS; SUBCUTANEOUS at 21:42

## 2024-08-02 RX ADMIN — HEPARIN SODIUM 5000 UNITS: 5000 INJECTION INTRAVENOUS; SUBCUTANEOUS at 05:08

## 2024-08-02 RX ADMIN — ACETAMINOPHEN 650 MG: 325 TABLET ORAL at 05:08

## 2024-08-02 RX ADMIN — IBUPROFEN 600 MG: 600 TABLET, FILM COATED ORAL at 21:49

## 2024-08-02 RX ADMIN — OXYCODONE HYDROCHLORIDE 10 MG: 10 TABLET ORAL at 12:08

## 2024-08-02 RX ADMIN — ACETAMINOPHEN 650 MG: 325 TABLET ORAL at 23:47

## 2024-08-02 RX ADMIN — ROSUVASTATIN CALCIUM 20 MG: 20 TABLET, COATED ORAL at 21:42

## 2024-08-02 RX ADMIN — Medication 10 ML: at 08:14

## 2024-08-02 RX ADMIN — SODIUM CHLORIDE, POTASSIUM CHLORIDE, SODIUM LACTATE AND CALCIUM CHLORIDE 75 ML/HR: 600; 310; 30; 20 INJECTION, SOLUTION INTRAVENOUS at 21:43

## 2024-08-02 RX ADMIN — Medication 10 ML: at 21:42

## 2024-08-02 RX ADMIN — SODIUM CHLORIDE, POTASSIUM CHLORIDE, SODIUM LACTATE AND CALCIUM CHLORIDE 75 ML/HR: 600; 310; 30; 20 INJECTION, SOLUTION INTRAVENOUS at 08:12

## 2024-08-02 NOTE — PLAN OF CARE
Goal Outcome Evaluation:  Plan of Care Reviewed With: patient        Progress: no change  Outcome Evaluation: VSS. Pt remains on RA with no signs of SOA. Pt remains NSR on the monitor. Pt complained of pain a couple of times with prn meds given. Diet advanced to a reg diet with no issues. LR continues to infuse. Pt to DC in the morning.

## 2024-08-02 NOTE — POST-PROCEDURE NOTE
"Assessment & Plan   Postop day 2 open hysterectomy and BSO for ovarian torsion and pelvic mass.  OR discussed in detail including operative findings  Plan advance to regular diet and activity as tolerated.  Minimal flatus.  Pain well managed  Path pending  Likely home tomorrow     Subjective   Subjective  Temp:  [97.8 °F (36.6 °C)-98.7 °F (37.1 °C)] 98.3 °F (36.8 °C)  Heart Rate:  [53-69] 56  Resp:  [16] 16  BP: (102-148)/(63-93) 131/67  I/O last 3 completed shifts:  In: 1325 [P.O.:1325]  Out: 500 [Urine:500]  I/O this shift:  In: 236 [P.O.:236]  Out: 800 [Urine:800]    Objective   Objective:  Vital signs (most recent): Blood pressure 131/67, pulse 56, temperature 98.3 °F (36.8 °C), temperature source Oral, resp. rate 16, height 160 cm (63\"), weight 69.8 kg (153 lb 14.4 oz), SpO2 93%, not currently breastfeeding.      Adnexal mass    Essential hypertension    Mixed hyperlipidemia    Left renal stone    Pelvic mass in female      "

## 2024-08-02 NOTE — CASE MANAGEMENT/SOCIAL WORK
Continued Stay Note  [facility]     Patient Name: Yolande Acosta  MRN: [id number]  Today's Date: 8/2/2024    Admit Date: 7/30/2024    Plan: Home   Discharge Plan       Row Name 08/02/24 1233       Plan    Plan Home    Plan Comments Patient discharge plan is home with private transport. There are no discharge needs identifed at this time.    Final Discharge Disposition Code 01 - home or self-care                   Discharge Codes    No documentation.                 Expected Discharge Date and Time       Expected Discharge Date Expected Discharge Time    Aug 2, 2024               Joyce Calhoun RN

## 2024-08-02 NOTE — PROGRESS NOTES
[facility] Medicine Services  PROGRESS NOTE    Patient Name: Yolande Acosta  : 1955  MRN: [id number]    Date of Admission: 2024  Primary Care Physician: Farrah Pandey,     Subjective   Subjective     CC:  Adnexal Mass     HPI:  Feels fine, would like us to advance her diet.  No BM yet, but some gas.      Objective   Objective     Vital Signs:   Temp:  [97.8 °F (36.6 °C)-98.7 °F (37.1 °C)] 98.4 °F (36.9 °C)  Heart Rate:  [53-56] 54  Resp:  [16] 16  BP: (102-148)/(63-78) 140/78     Physical Exam:  NAD, sitting up in chair  MM moist  RRR  CTAB  Abd soft, NT to light palpation  Normal affect  Alert, speech clear      Results Reviewed:  LAB RESULTS:      Lab 24  0500 24  22124  1150   WBC 11.95* 14.60* 11.66* 14.23*  --    HEMOGLOBIN 10.1* 11.2* 11.5* 12.8  --    HEMATOCRIT 31.2* 34.7 36.0 39.6  --    PLATELETS 305 279 254 269  --    NEUTROS ABS 9.26* 13.49* 9.15* 10.70*  --    IMMATURE GRANS (ABS) 0.05 0.07* 0.04 0.03  --    LYMPHS ABS 1.86 0.58* 1.35 2.24  --    MONOS ABS 0.72 0.45 1.01* 1.19*  --    EOS ABS 0.03 0.00 0.06 0.03  --    MCV 83.9 83.2 84.9 84.8  --    PROCALCITONIN  --   --   --   --  <0.14         Lab 24  0500 24  2211   SODIUM 141 137 138 140   POTASSIUM 4.5 4.4 4.1 4.4   CHLORIDE 107 104 105 105   CO2 25.0 20.0* 24.0 24.0   ANION GAP 9.0 13.0 9.0 11.0   BUN 8 9 10 8   CREATININE 0.66 0.65 0.80 0.85   EGFR 94.5 94.9 79.4 73.8   GLUCOSE 100* 148* 135* 100*   CALCIUM 8.4* 8.5* 8.3* 9.1         Lab 24  2211 24  1150   TOTAL PROTEIN 7.2  --    ALBUMIN 3.7  --    GLOBULIN 3.5  --    ALT (SGPT) 21  --    AST (SGOT) 29  --    BILIRUBIN 0.8  --    ALK PHOS 100  --    AMYLASE  --  78   LIPASE  --  28                 Lab 24  1227   ABO TYPING O   RH TYPING Positive   ANTIBODY SCREEN Negative         Brief Urine Lab Results       None             Microbiology Results Abnormal       None            Peripheral Block    Result Date: 7/31/2024  Lacey Grant, CRNA     7/31/2024  2:03 PM Peripheral Block Pre-sedation assessment completed: 7/31/2024 1:34 PM Patient reassessed immediately prior to procedure Patient location during procedure: OR Start time: 7/31/2024 1:45 PM Stop time: 7/31/2024 1:49 PM Reason for block: at surgeon's request and post-op pain management Performed by Anesthesiologist: Mickey Weiss MD Preanesthetic Checklist Completed: patient identified, IV checked, site marked, risks and benefits discussed, surgical consent, monitors and equipment checked, pre-op evaluation and timeout performed Prep: Pt Position: supine Sterile barriers:cap, gloves, mask and washed/disinfected hands Prep: ChloraPrep Patient monitoring: blood pressure monitoring, continuous pulse oximetry and EKG Procedure Sedation: yes Performed under: general Guidance:ultrasound guided Images:still images obtained, printed/placed on chart Laterality:Bilateral Block Type:TAP Injection Technique:single-shot Needle Type:short-bevel and echogenic Needle Gauge:20 G Resistance on Injection: none Medications Used: dexamethasone sodium phosphate injection - Injection  4 mg - 7/31/2024 1:49:00 PM bupivacaine PF (MARCAINE) 0.25 % injection - Injection  60 mL - 7/31/2024 1:49:00 PM Medications Comment:Block Injection:  LA dose divided between Right and Left block Post Assessment Injection Assessment: negative aspiration for heme, incremental injection and no paresthesia on injection Patient Tolerance:comfortable throughout block Complications:no Additional Notes Subcostal TAPs A high-frequency linear transducer, with sterile cover, was placed sub-xiphoid to identify Linea Alba, right and left Rectus Abdominus Muscles (HAILEY). The transducer was moved either right or left subcostally to identify the HAILEY and the Transverse Abdominus Muscle (MAE). The insertion site was prepped  "in sterile fashion and then localized with 2-5 ml of 1% Lidocaine. Using ultrasound-guidance, a 20-gauge B-Myers 4\" Ultraplex 360 non-stimulating echogenic needle was advanced in plane, from medial to lateral, until the tip of the needle was in the fascial plane between the HAILEY and MAE. 1-3ml of preservative free normal saline was used to hydro-dissect the fascial planes. After the fascial plane was verified, the local anesthetic (LA) was injected. The procedure was repeated on the opposite side for bilateral coverage. Aspiration every 5 ml to prevent intravascular injection. Injection was completed with negative aspiration of blood and negative intravascular injection. Injection pressures were normal with minimal resistance. The subcostal approach to the TAP nerve block ideally anesthetizes the intercostal nerves T6-T9. Mid-Axillary/Lateral TAPs A high-frequency linear transducer, with sterile cover, was placed in the midaxillary line between the ASIS and costal margin. The External Oblique Muscle (EOM), Internal Oblique Muscle (IOM), Transverse Abdominus Muscle (MAE), and Peritoneum were identified. The insertion site was prepped in sterile fashion and then localized with 2-5 ml of 1% Lidocaine. Using ultrasound-guidance, a 20-gauge B-Myers 4\" Ultraplex 360 non-stimulating echogenic needle was advanced in plane, from medial to lateral, until the tip of the needle was in the fascial plane between the IOM and MAE. 1-3ml of preservative free normal saline was used to hydro-dissect the fascial planes. After the fascial plane was verified, the local anesthetic (LA) was injected. The procedure was repeated on the opposite side for bilateral coverage. Aspiration every 5 ml to prevent intravascular injection. Injection was completed with negative aspiration of blood and negative intravascular injection. Injection pressures were normal with minimal resistance. Midaxillary TAPs should reach intercostal nerves T10- T11 and " the subcostal nerve T12.  Performed by: Lacey Grant CRNA          Current medications:  Scheduled Meds:acetaminophen, 650 mg, Oral, Q6H  [Held by provider] aspirin, 81 mg, Oral, Daily  famotidine, 20 mg, Oral, Once  heparin (porcine), 5,000 Units, Subcutaneous, Q8H  [Held by provider] lisinopril, 10 mg, Oral, Daily  rosuvastatin, 20 mg, Oral, Nightly  sodium chloride, 10 mL, Intravenous, Q12H      Continuous Infusions:lactated ringers, 9 mL/hr, Last Rate: 9 mL/hr (07/31/24 1327)  lactated ringers, 75 mL/hr, Last Rate: 75 mL/hr (08/02/24 0812)  sodium chloride, 75 mL/hr, Last Rate: 75 mL/hr (07/31/24 0633)      PRN Meds:.  acetaminophen **OR** acetaminophen **OR** acetaminophen    HYDROmorphone **AND** naloxone    HYDROmorphone **AND** [DISCONTINUED] naloxone    ibuprofen    melatonin    nitroglycerin    ondansetron ODT **OR** ondansetron    oxyCODONE    oxyCODONE    promethazine **OR** promethazine    sodium chloride    sodium chloride    Assessment & Plan   Assessment & Plan     Active Hospital Problems    Diagnosis  POA    **Adnexal mass [N94.89]  Yes    Essential hypertension [I10]  Yes    Mixed hyperlipidemia [E78.2]  Yes    Left renal stone [N20.0]  Yes    Pelvic mass in female [R19.00]  Unknown      Resolved Hospital Problems   No resolved problems to display.        Brief Hospital Course to date:  Yolande Acosta is a 68 y.o. female with PMHx of HTN, and HLD who was transferred here from OSH for higher level of care rearding the finding of a new adnexal mass. Patient initially presented to [facility] ED with a week of LLQ abdominal pain. Imaging at OSH showed the presence of a new large left adnexal mass.     Large pelvic mass  -Gyn Onc suspecting possibility of ovarian torsion prompting patient's pain, however malignancy still within differential.  -Pelvic US as OSH showed the presence of a new 12cm left adnexal mass.   -POD 1 open hysterectomy and BSO   -abdominal binder  -mobilize  -advance  diet as tolerated    Mild leukocytosis  -Likely reactive   -afebrile  -WBC count improved    Bilateral nephrolithiasis  -Imaging at OSH showed right renal stone 5 mm, multiple left renal stones measuring up to 20 mm.  -consider follow-up with urology on discharge.     Hypertension  -Holding lisinopril. Add back as appropriate.    Hyperlipidemia  -Continue rosuvastatin.    Expected Discharge Location and Transportation: Home  Expected Discharge   Expected Discharge Date: 8/2/2024; Expected Discharge Time:      VTE Prophylaxis:  Pharmacologic & mechanical VTE prophylaxis orders are present.         AM-PAC 6 Clicks Score (PT): 24 (08/01/24 2035)    CODE STATUS:   Code Status and Medical Interventions: CPR (Attempt to Resuscitate); Full Support   Ordered at: 07/31/24 1645     Code Status (Patient has no pulse and is not breathing):    CPR (Attempt to Resuscitate)     Medical Interventions (Patient has pulse or is breathing):    Full Support       Ilya Carreno MD  08/02/24

## 2024-08-03 VITALS
HEART RATE: 54 BPM | WEIGHT: 153.9 LBS | OXYGEN SATURATION: 95 % | SYSTOLIC BLOOD PRESSURE: 137 MMHG | HEIGHT: 63 IN | RESPIRATION RATE: 16 BRPM | DIASTOLIC BLOOD PRESSURE: 78 MMHG | BODY MASS INDEX: 27.27 KG/M2 | TEMPERATURE: 97.8 F

## 2024-08-03 PROCEDURE — 99239 HOSP IP/OBS DSCHRG MGMT >30: CPT | Performed by: INTERNAL MEDICINE

## 2024-08-03 PROCEDURE — 25010000002 HEPARIN (PORCINE) PER 1000 UNITS: Performed by: OBSTETRICS & GYNECOLOGY

## 2024-08-03 RX ADMIN — HEPARIN SODIUM 5000 UNITS: 5000 INJECTION INTRAVENOUS; SUBCUTANEOUS at 05:45

## 2024-08-03 RX ADMIN — HEPARIN SODIUM 5000 UNITS: 5000 INJECTION INTRAVENOUS; SUBCUTANEOUS at 13:11

## 2024-08-03 RX ADMIN — ACETAMINOPHEN 650 MG: 325 TABLET ORAL at 13:10

## 2024-08-03 RX ADMIN — ACETAMINOPHEN 650 MG: 325 TABLET ORAL at 05:45

## 2024-08-03 NOTE — DISCHARGE SUMMARY
[facility] Medicine Services  DISCHARGE SUMMARY    Patient Name: Yolande Acosta  : 1955  MRN: [id number]    Date of Admission: 2024  8:58 PM  Date of Discharge:  8/3/24  Primary Care Physician: Farrah Pandey DO    Consults       Date and Time Order Name Status Description    2024 10:29 PM Inpatient Gynecologic Oncology Consult Completed             Hospital Course     Presenting Problem: abdominal pain    Active Hospital Problems    Diagnosis  POA    **Adnexal mass [N94.89]  Yes    Essential hypertension [I10]  Yes    Mixed hyperlipidemia [E78.2]  Yes    Left renal stone [N20.0]  Yes    Pelvic mass in female [R19.00]  Unknown      Resolved Hospital Problems   No resolved problems to display.          Hospital Course:  Yolande Acosta is a 68 y.o. female with PMHx of HTN, and HLD who was transferred here from OSH for higher level of care rearding the finding of a new adnexal mass. Patient initially presented to [facility] ED with a week of LLQ abdominal pain. Imaging at OSH showed the presence of a new large left adnexal mass.      Large pelvic mass  -Gyn Onc suspecting possibility of ovarian torsion prompting patient's pain, however malignancy still within differential.  -Pelvic US as OSH showed the presence of a new 12cm left adnexal mass.   -POD # 3 open hysterectomy and BSO, Dr Bautista  -abdominal binder  -mobilize  -patient tolerating diet, + flatus, eager to go home  -follow up in Gyn Onc clinic in 1-2 weeks     Mild leukocytosis  -Likely reactive   -afebrile  -WBC count improved     Bilateral nephrolithiasis  -Imaging at OSH showed right renal stone 5 mm, multiple left renal stones measuring up to 20 mm.  -PCP to further manage, patient currently asymptomatic, consider urology referral     Hypertension     Hyperlipidemia      Discharge Follow Up Recommendations for outpatient labs/diagnostics:       Day of Discharge     HPI:   Feeling much  better.  Tolerating solid diet. + flatus.  No abdominal pain today.  Anxious to go home.    Review of Systems  Gen: no fever  GI: no abdominal pain    Vital Signs:   Temp:  [97.8 °F (36.6 °C)-98.2 °F (36.8 °C)] 97.8 °F (36.6 °C)  Heart Rate:  [54-67] 54  Resp:  [16] 16  BP: (131-148)/(72-78) 137/78      Physical Exam:  NAD, in bed  MM moist  RRR  CTAB  Abd soft, NT, wearing abdominal binder  Normal affect  Alert, speech clear    Pertinent  and/or Most Recent Results     LAB RESULTS:      Lab 08/02/24  0406 08/01/24 0405 07/31/24  0500 07/30/24 2211 07/30/24  1150   WBC 11.95* 14.60* 11.66* 14.23*  --    HEMOGLOBIN 10.1* 11.2* 11.5* 12.8  --    HEMATOCRIT 31.2* 34.7 36.0 39.6  --    PLATELETS 305 279 254 269  --    NEUTROS ABS 9.26* 13.49* 9.15* 10.70*  --    IMMATURE GRANS (ABS) 0.05 0.07* 0.04 0.03  --    LYMPHS ABS 1.86 0.58* 1.35 2.24  --    MONOS ABS 0.72 0.45 1.01* 1.19*  --    EOS ABS 0.03 0.00 0.06 0.03  --    MCV 83.9 83.2 84.9 84.8  --    PROCALCITONIN  --   --   --   --  <0.14         Lab 08/02/24  0406 08/01/24 0405 07/31/24 0500 07/30/24 2211   SODIUM 141 137 138 140   POTASSIUM 4.5 4.4 4.1 4.4   CHLORIDE 107 104 105 105   CO2 25.0 20.0* 24.0 24.0   ANION GAP 9.0 13.0 9.0 11.0   BUN 8 9 10 8   CREATININE 0.66 0.65 0.80 0.85   EGFR 94.5 94.9 79.4 73.8   GLUCOSE 100* 148* 135* 100*   CALCIUM 8.4* 8.5* 8.3* 9.1         Lab 07/30/24 2211 07/30/24  1150   TOTAL PROTEIN 7.2  --    ALBUMIN 3.7  --    GLOBULIN 3.5  --    ALT (SGPT) 21  --    AST (SGOT) 29  --    BILIRUBIN 0.8  --    ALK PHOS 100  --    AMYLASE  --  78   LIPASE  --  28                 Lab 07/31/24  1227   ABO TYPING O   RH TYPING Positive   ANTIBODY SCREEN Negative         Brief Urine Lab Results       None          Microbiology Results (last 10 days)       ** No results found for the last 240 hours. **            Peripheral Block    Result Date: 7/31/2024  Lacey Grant CRNA     7/31/2024  2:03 PM Peripheral Block Pre-sedation  "assessment completed: 7/31/2024 1:34 PM Patient reassessed immediately prior to procedure Patient location during procedure: OR Start time: 7/31/2024 1:45 PM Stop time: 7/31/2024 1:49 PM Reason for block: at surgeon's request and post-op pain management Performed by Anesthesiologist: Mickey Weiss MD Preanesthetic Checklist Completed: patient identified, IV checked, site marked, risks and benefits discussed, surgical consent, monitors and equipment checked, pre-op evaluation and timeout performed Prep: Pt Position: supine Sterile barriers:cap, gloves, mask and washed/disinfected hands Prep: ChloraPrep Patient monitoring: blood pressure monitoring, continuous pulse oximetry and EKG Procedure Sedation: yes Performed under: general Guidance:ultrasound guided Images:still images obtained, printed/placed on chart Laterality:Bilateral Block Type:TAP Injection Technique:single-shot Needle Type:short-bevel and echogenic Needle Gauge:20 G Resistance on Injection: none Medications Used: dexamethasone sodium phosphate injection - Injection  4 mg - 7/31/2024 1:49:00 PM bupivacaine PF (MARCAINE) 0.25 % injection - Injection  60 mL - 7/31/2024 1:49:00 PM Medications Comment:Block Injection:  LA dose divided between Right and Left block Post Assessment Injection Assessment: negative aspiration for heme, incremental injection and no paresthesia on injection Patient Tolerance:comfortable throughout block Complications:no Additional Notes Subcostal TAPs A high-frequency linear transducer, with sterile cover, was placed sub-xiphoid to identify Linea Alba, right and left Rectus Abdominus Muscles (HAILEY). The transducer was moved either right or left subcostally to identify the HAILEY and the Transverse Abdominus Muscle (MAE). The insertion site was prepped in sterile fashion and then localized with 2-5 ml of 1% Lidocaine. Using ultrasound-guidance, a 20-gauge B-Myers 4\" Ultraplex 360 non-stimulating echogenic needle was advanced in " "plane, from medial to lateral, until the tip of the needle was in the fascial plane between the HAILEY and MAE. 1-3ml of preservative free normal saline was used to hydro-dissect the fascial planes. After the fascial plane was verified, the local anesthetic (LA) was injected. The procedure was repeated on the opposite side for bilateral coverage. Aspiration every 5 ml to prevent intravascular injection. Injection was completed with negative aspiration of blood and negative intravascular injection. Injection pressures were normal with minimal resistance. The subcostal approach to the TAP nerve block ideally anesthetizes the intercostal nerves T6-T9. Mid-Axillary/Lateral TAPs A high-frequency linear transducer, with sterile cover, was placed in the midaxillary line between the ASIS and costal margin. The External Oblique Muscle (EOM), Internal Oblique Muscle (IOM), Transverse Abdominus Muscle (MAE), and Peritoneum were identified. The insertion site was prepped in sterile fashion and then localized with 2-5 ml of 1% Lidocaine. Using ultrasound-guidance, a 20-gauge B-Myers 4\" Ultraplex 360 non-stimulating echogenic needle was advanced in plane, from medial to lateral, until the tip of the needle was in the fascial plane between the IOM and MAE. 1-3ml of preservative free normal saline was used to hydro-dissect the fascial planes. After the fascial plane was verified, the local anesthetic (LA) was injected. The procedure was repeated on the opposite side for bilateral coverage. Aspiration every 5 ml to prevent intravascular injection. Injection was completed with negative aspiration of blood and negative intravascular injection. Injection pressures were normal with minimal resistance. Midaxillary TAPs should reach intercostal nerves T10- T11 and the subcostal nerve T12.  Performed by: Lacey Grant CRNA     US non-ob transvaginal    Result Date: 7/30/2024  PELVIC ULTRASOUND HISTORY: Pelvic pain. PROCEDURE: Ultrasound " images of the pelvis were obtained. FINDINGS: The uterus measures 8 x 5 cm. The endometrium is  normal at 3 mm. The right ovary demonstrates a 12 cm cystic mass. Mural complex  lesions are noted. The left ovary  is not identified.  There is no significant free fluid .    Large right adnexal mass. 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 Images reviewed, interpreted, and dictated by JIM Elder MD    CT Abdomen Pelvis With Contrast    Result Date: 7/30/2024  CT SCAN OF THE ABDOMEN AND PELVIS WITH CONTRAST    7/30/2024 12:31 PM HISTORY: Acute lower abdominal pain and tenderness. COMPARISON: None. PROCEDURE: The patient was injected with IV contrast. Axial images were obtained from the lung bases to the pubic symphysis by computed tomography. This study was performed with techniques to keep radiation doses as low as reasonably achievable, (ALARA). Individualized dose reduction techniques using automated exposure control or adjustment of mA and/or kV according to the patient size were employed. FINDINGS: ABDOMEN: The lung bases are clear. The heart is proper size. There is a small hiatal hernia. The liver is homogenous with no focal abnormality. There are gallstones within the gallbladder. The spleen is unremarkable. No adrenal mass is present.  The pancreas is unremarkable. The kidneys demonstrate parapelvic cysts bilaterally. There is a right renal stone measuring 5 mm. There are multiple left renal stones measuring up to 20 mm. The aorta is proper caliber. There is no free fluid or adenopathy. PELVIS: There is sigmoid diverticulosis throughout the transverse colon. The GI tract demonstrates no obstruction.  The appendix is not identified. The urinary bladder is unremarkable. There is a cystic mass measuring up to 16 cm with multiple mural complex cysts measuring up to 4 cm. The uterus is deviated to the left.    Bilateral nephrolithiasis. Large cystic mass in the pelvis which extends into the abdomen.  This likely represents ovarian neoplasm. Images reviewed, interpreted, and dictated by Dr. JIM Elder. Transcribed by Patricia Montenegro PA-C.                 Plan for Follow-up of Pending Labs/Results:   Pending Labs       Order Current Status    NON-GYN CYTOLOGY, [facility] ([facility]) In process          Discharge Details        Discharge Medications        Continue These Medications        Instructions Start Date   aspirin 81 MG chewable tablet   81 mg, Oral, Daily      lisinopril 10 MG tablet  Commonly known as: PRINIVIL,ZESTRIL   10 mg, Oral, Daily      Multi-Vitamin/Fluoride/Iron 0.25-10 MG/ML solution   1 tablet, Oral, Daily      rosuvastatin 20 MG tablet  Commonly known as: CRESTOR   20 mg, Oral, Daily      Vitamin D (Cholecalciferol) 10 MCG (400 UNIT) tablet  Commonly known as: CHOLECALCIFEROL   500 Units, Oral, Daily               No Known Allergies      Discharge Disposition:  Home or Self Care    Diet:  Hospital:  Diet Order   Procedures    Diet: Regular/House; Fluid Consistency: Thin (IDDSI 0)            Activity:      Restrictions or Other Recommendations:         CODE STATUS:    Code Status and Medical Interventions: CPR (Attempt to Resuscitate); Full Support   Ordered at: 07/31/24 6232     Code Status (Patient has no pulse and is not breathing):    CPR (Attempt to Resuscitate)     Medical Interventions (Patient has pulse or is breathing):    Full Support       No future appointments.    Additional Instructions for the Follow-ups that You Need to Schedule       Discharge Follow-up with PCP   As directed       Currently Documented PCP:    Farrah Pandey DO    PCP Phone Number:    [phone]     Follow Up Details: follow up with PCP in one week        Discharge Follow-up with Specialty: follow up with Gyn Onc Dr Abarca in 1-2 weeks   As directed      Specialty: follow up with Gyn Onc Dr Abarca in 1-2 weeks                      Ilya Carreno  MD  08/03/24      Time Spent on Discharge:  I spent  35  minutes on this discharge activity which included: face-to-face encounter with the patient, reviewing the data in the system, coordination of the care with the nursing staff as well as consultants, documentation, and entering orders.

## 2024-08-03 NOTE — PLAN OF CARE
Goal Outcome Evaluation:      Pt's VSS, RA, NSR-SB, pain addressed with PRN Ibuprofen. Pt complained of heartburn beginning of shift, APRN notified and Pepcid given with relief. Continue POC.      Problem: Adult Inpatient Plan of Care  Goal: Plan of Care Review  Outcome: Ongoing, Progressing  Goal: Patient-Specific Goal (Individualized)  Outcome: Ongoing, Progressing  Goal: Absence of Hospital-Acquired Illness or Injury  Outcome: Ongoing, Progressing  Intervention: Identify and Manage Fall Risk  Recent Flowsheet Documentation  Taken 8/3/2024 0600 by Sunitha Wright RN  Safety Promotion/Fall Prevention: safety round/check completed  Taken 8/3/2024 0400 by Sunitha Wright RN  Safety Promotion/Fall Prevention: safety round/check completed  Taken 8/3/2024 0200 by Sunitha Wright RN  Safety Promotion/Fall Prevention: safety round/check completed  Taken 8/3/2024 0000 by Adriana, Sunitha, RN  Safety Promotion/Fall Prevention: safety round/check completed  Taken 8/2/2024 2200 by Sunitha Wright RN  Safety Promotion/Fall Prevention: safety round/check completed  Taken 8/2/2024 2110 by Adriana, Sunitha, RN  Safety Promotion/Fall Prevention:   activity supervised   clutter free environment maintained   fall prevention program maintained   nonskid shoes/slippers when out of bed   safety round/check completed   room organization consistent  Intervention: Prevent Skin Injury  Recent Flowsheet Documentation  Taken 8/3/2024 0600 by Sunitha Wright RN  Body Position: position changed independently  Taken 8/3/2024 0400 by Sunitha Wright RN  Body Position: position changed independently  Taken 8/3/2024 0200 by Sunitha Wright RN  Body Position: position changed independently  Taken 8/3/2024 0000 by Sunitha Wright RN  Body Position: position changed independently  Taken 8/2/2024 2200 by Sunitha Wright RN  Body Position: position changed independently  Taken 8/2/2024 2110 by  Sunitha Wright RN  Body Position: position changed independently  Skin Protection:   adhesive use limited   incontinence pads utilized   tubing/devices free from skin contact  Intervention: Prevent and Manage VTE (Venous Thromboembolism) Risk  Recent Flowsheet Documentation  Taken 8/2/2024 2110 by Sunitha Wright RN  Activity Management: up in chair  VTE Prevention/Management: (subq hep) other (see comments)  Intervention: Prevent Infection  Recent Flowsheet Documentation  Taken 8/3/2024 0600 by Sunitha Wright RN  Infection Prevention:   rest/sleep promoted   single patient room provided  Taken 8/3/2024 0400 by Sunitha Wright RN  Infection Prevention:   rest/sleep promoted   single patient room provided  Taken 8/3/2024 0200 by Sunitha Wright RN  Infection Prevention:   single patient room provided   rest/sleep promoted  Taken 8/3/2024 0000 by Sunitha Wright RN  Infection Prevention:   rest/sleep promoted   single patient room provided  Taken 8/2/2024 2200 by Sunitha Wright RN  Infection Prevention:   rest/sleep promoted   single patient room provided  Taken 8/2/2024 2110 by Sunitha Wright RN  Infection Prevention:   cohorting utilized   rest/sleep promoted   single patient room provided  Goal: Optimal Comfort and Wellbeing  Outcome: Ongoing, Progressing  Intervention: Provide Person-Centered Care  Recent Flowsheet Documentation  Taken 8/2/2024 2110 by Sunitha Wright RN  Trust Relationship/Rapport:   care explained   questions answered   questions encouraged   reassurance provided   thoughts/feelings acknowledged   empathic listening provided  Goal: Readiness for Transition of Care  Outcome: Ongoing, Progressing     Problem: Pain Acute  Goal: Acceptable Pain Control and Functional Ability  Outcome: Ongoing, Progressing  Intervention: Prevent or Manage Pain  Recent Flowsheet Documentation  Taken 8/2/2024 2110 by Sunitha Wright RN  Medication Review/Management:  medications reviewed  Intervention: Optimize Psychosocial Wellbeing  Recent Flowsheet Documentation  Taken 8/2/2024 2110 by Sunitha Wright RN  Diversional Activities: television     Problem: Fall Injury Risk  Goal: Absence of Fall and Fall-Related Injury  Outcome: Ongoing, Progressing  Intervention: Identify and Manage Contributors  Recent Flowsheet Documentation  Taken 8/2/2024 2110 by Sunitha Wright RN  Medication Review/Management: medications reviewed  Intervention: Promote Injury-Free Environment  Recent Flowsheet Documentation  Taken 8/3/2024 0600 by Sunitha Wright RN  Safety Promotion/Fall Prevention: safety round/check completed  Taken 8/3/2024 0400 by Sunitha Wright RN  Safety Promotion/Fall Prevention: safety round/check completed  Taken 8/3/2024 0200 by Sunitha Wright RN  Safety Promotion/Fall Prevention: safety round/check completed  Taken 8/3/2024 0000 by Adriana, Sunitha, RN  Safety Promotion/Fall Prevention: safety round/check completed  Taken 8/2/2024 2200 by Sunitha Wright RN  Safety Promotion/Fall Prevention: safety round/check completed  Taken 8/2/2024 2110 by Adriana, Sunitha, RN  Safety Promotion/Fall Prevention:   activity supervised   clutter free environment maintained   fall prevention program maintained   nonskid shoes/slippers when out of bed   safety round/check completed   room organization consistent

## 2024-08-04 ENCOUNTER — READMISSION MANAGEMENT (OUTPATIENT)
Dept: CALL CENTER | Facility: HOSPITAL | Age: 69
End: 2024-08-04
Payer: MEDICARE

## 2024-08-05 ENCOUNTER — TELEPHONE (OUTPATIENT)
Dept: GYNECOLOGIC ONCOLOGY | Facility: CLINIC | Age: 69
End: 2024-08-05
Payer: MEDICARE

## 2024-08-05 LAB — REF LAB TEST METHOD: NORMAL

## 2024-08-05 NOTE — PAYOR COMM NOTE
"Kellie Benoit (68 y.o. Female)     [id number]     Terri Jain RN  Utilization Review  [phone]  [phone]        Date of Birth   1955    Social Security Number       Address   [address] [place] [address]  [facility] [address]    Home Phone       MRN   [id number]       [facility]    Marital Status                               Admission Date   24    Admission Type   Urgent    Admitting Provider   Ilya Carreno MD    Attending Provider       Department, Room/Bed   [facility] 6B, N638/1       Discharge Date   8/3/2024    Discharge Disposition   Home or Self Care    Discharge Destination   Home                              Attending Provider: (none)   Allergies: No Known Allergies    Isolation: None   Infection: None   Code Status: Prior    Ht: 160 cm (63\")   Wt: 69.8 kg (153 lb 14.4 oz)    Admission Cmt: None   Principal Problem: Adnexal mass [N94.89]                   Active Insurance as of 2024       Primary Coverage       Payor Plan Insurance Group Employer/Plan Group    ANTHEM MEDICARE REPLACEMENT ANTHEM MEDICARE ADVANTAGE [id number]       Payor Plan Address Payor Plan Phone Number Payor Plan Fax Number Effective Dates    [address] [phone]  2024 - None Entered    [facility] [address]         Subscriber Name Subscriber Birth Date Member ID       KELLIE BENOIT 1955 [id number]                     Emergency Contacts        (Rel.) Home Phone Work Phone Mobile Phone    Jm Benoit (Spouse) [phone] -- [phone]                 Discharge Summary        Ilya Carreno MD at [address] [place] [address] Medicine Services  DISCHARGE SUMMARY    Patient Name: Kellie Benoit  : 1955  MRN: [id number]    Date of Admission: 2024  8:58 PM  Date of Discharge:  8/3/24  Primary Care Physician: Farrah Pandey DO    Consults       Date and Time Order Name " Status Description    7/30/2024 10:29 PM Inpatient Gynecologic Oncology Consult Completed             Hospital Course     Presenting Problem: abdominal pain    Active Hospital Problems    Diagnosis  POA    **Adnexal mass [N94.89]  Yes    Essential hypertension [I10]  Yes    Mixed hyperlipidemia [E78.2]  Yes    Left renal stone [N20.0]  Yes    Pelvic mass in female [R19.00]  Unknown      Resolved Hospital Problems   No resolved problems to display.          Hospital Course:  Yolande Acosta is a 68 y.o. female with PMHx of HTN, and HLD who was transferred here from OSH for higher level of care rearding the finding of a new adnexal mass. Patient initially presented to [facility] ED with a week of LLQ abdominal pain. Imaging at OSH showed the presence of a new large left adnexal mass.      Large pelvic mass  -Gyn Onc suspecting possibility of ovarian torsion prompting patient's pain, however malignancy still within differential.  -Pelvic US as OSH showed the presence of a new 12cm left adnexal mass.   -POD # 3 open hysterectomy and BSO, Dr Bautista  -abdominal binder  -mobilize  -patient tolerating diet, + flatus, eager to go home  -follow up in Gyn Onc clinic in 1-2 weeks     Mild leukocytosis  -Likely reactive   -afebrile  -WBC count improved     Bilateral nephrolithiasis  -Imaging at OSH showed right renal stone 5 mm, multiple left renal stones measuring up to 20 mm.  -PCP to further manage, patient currently asymptomatic, consider urology referral     Hypertension     Hyperlipidemia      Discharge Follow Up Recommendations for outpatient labs/diagnostics:       Day of Discharge     HPI:   Feeling much better.  Tolerating solid diet. + flatus.  No abdominal pain today.  Anxious to go home.    Review of Systems  Gen: no fever  GI: no abdominal pain    Vital Signs:   Temp:  [97.8 °F (36.6 °C)-98.2 °F (36.8 °C)] 97.8 °F (36.6 °C)  Heart Rate:  [54-67] 54  Resp:  [16] 16  BP: (131-148)/(72-78) 137/78      Physical  Exam:  NAD, in bed  MM moist  RRR  CTAB  Abd soft, NT, wearing abdominal binder  Normal affect  Alert, speech clear    Pertinent  and/or Most Recent Results     LAB RESULTS:      Lab 08/02/24  0406 08/01/24 0405 07/31/24  0500 07/30/24 2211 07/30/24  1150   WBC 11.95* 14.60* 11.66* 14.23*  --    HEMOGLOBIN 10.1* 11.2* 11.5* 12.8  --    HEMATOCRIT 31.2* 34.7 36.0 39.6  --    PLATELETS 305 279 254 269  --    NEUTROS ABS 9.26* 13.49* 9.15* 10.70*  --    IMMATURE GRANS (ABS) 0.05 0.07* 0.04 0.03  --    LYMPHS ABS 1.86 0.58* 1.35 2.24  --    MONOS ABS 0.72 0.45 1.01* 1.19*  --    EOS ABS 0.03 0.00 0.06 0.03  --    MCV 83.9 83.2 84.9 84.8  --    PROCALCITONIN  --   --   --   --  <0.14         Lab 08/02/24  0406 08/01/24 0405 07/31/24 0500 07/30/24  2211   SODIUM 141 137 138 140   POTASSIUM 4.5 4.4 4.1 4.4   CHLORIDE 107 104 105 105   CO2 25.0 20.0* 24.0 24.0   ANION GAP 9.0 13.0 9.0 11.0   BUN 8 9 10 8   CREATININE 0.66 0.65 0.80 0.85   EGFR 94.5 94.9 79.4 73.8   GLUCOSE 100* 148* 135* 100*   CALCIUM 8.4* 8.5* 8.3* 9.1         Lab 07/30/24 2211 07/30/24  1150   TOTAL PROTEIN 7.2  --    ALBUMIN 3.7  --    GLOBULIN 3.5  --    ALT (SGPT) 21  --    AST (SGOT) 29  --    BILIRUBIN 0.8  --    ALK PHOS 100  --    AMYLASE  --  78   LIPASE  --  28                 Lab 07/31/24  1227   ABO TYPING O   RH TYPING Positive   ANTIBODY SCREEN Negative         Brief Urine Lab Results       None          Microbiology Results (last 10 days)       ** No results found for the last 240 hours. **            Peripheral Block    Result Date: 7/31/2024  Lacey Grant, CRNA     7/31/2024  2:03 PM Peripheral Block Pre-sedation assessment completed: 7/31/2024 1:34 PM Patient reassessed immediately prior to procedure Patient location during procedure: OR Start time: 7/31/2024 1:45 PM Stop time: 7/31/2024 1:49 PM Reason for block: at surgeon's request and post-op pain management Performed by Anesthesiologist: Mickey Weiss MD  "Preanesthetic Checklist Completed: patient identified, IV checked, site marked, risks and benefits discussed, surgical consent, monitors and equipment checked, pre-op evaluation and timeout performed Prep: Pt Position: supine Sterile barriers:cap, gloves, mask and washed/disinfected hands Prep: ChloraPrep Patient monitoring: blood pressure monitoring, continuous pulse oximetry and EKG Procedure Sedation: yes Performed under: general Guidance:ultrasound guided Images:still images obtained, printed/placed on chart Laterality:Bilateral Block Type:TAP Injection Technique:single-shot Needle Type:short-bevel and echogenic Needle Gauge:20 G Resistance on Injection: none Medications Used: dexamethasone sodium phosphate injection - Injection  4 mg - 7/31/2024 1:49:00 PM bupivacaine PF (MARCAINE) 0.25 % injection - Injection  60 mL - 7/31/2024 1:49:00 PM Medications Comment:Block Injection:  LA dose divided between Right and Left block Post Assessment Injection Assessment: negative aspiration for heme, incremental injection and no paresthesia on injection Patient Tolerance:comfortable throughout block Complications:no Additional Notes Subcostal TAPs A high-frequency linear transducer, with sterile cover, was placed sub-xiphoid to identify Line[place] Alba, right and left Rectus Abdominus Muscles (HAILEY). The transducer was moved either right or left subcostally to identify the HAILEY and the Transverse Abdominus Muscle (MAE). The insertion site was prepped in sterile fashion and then localized with 2-5 ml of 1% Lidocaine. Using ultrasound-guidance, a 20-gauge B-Myers 4\" Ultraplex 360 non-stimulating echogenic needle was advanced in plane, from medial to lateral, until the tip of the needle was in the fascial plane between the HAILEY and MAE. 1-3ml of preservative free normal saline was used to hydro-dissect the fascial planes. After the fascial plane was verified, the local anesthetic (LA) was injected. The procedure was repeated on the " "opposite side for bilateral coverage. Aspiration every 5 ml to prevent intravascular injection. Injection was completed with negative aspiration of blood and negative intravascular injection. Injection pressures were normal with minimal resistance. The subcostal approach to the TAP nerve block ideally anesthetizes the intercostal nerves T6-T9. Mid-Axillary/Lateral TAPs A high-frequency linear transducer, with sterile cover, was placed in the midaxillary line between the ASIS and costal margin. The External Oblique Muscle (EOM), Internal Oblique Muscle (IOM), Transverse Abdominus Muscle (MAE), and Peritoneum were identified. The insertion site was prepped in sterile fashion and then localized with 2-5 ml of 1% Lidocaine. Using ultrasound-guidance, a 20-gauge B-Myers 4\" Ultraplex 360 non-stimulating echogenic needle was advanced in plane, from medial to lateral, until the tip of the needle was in the fascial plane between the IOM and MAE. 1-3ml of preservative free normal saline was used to hydro-dissect the fascial planes. After the fascial plane was verified, the local anesthetic (LA) was injected. The procedure was repeated on the opposite side for bilateral coverage. Aspiration every 5 ml to prevent intravascular injection. Injection was completed with negative aspiration of blood and negative intravascular injection. Injection pressures were normal with minimal resistance. Midaxillary TAPs should reach intercostal nerves T10- T11 and the subcostal nerve T12.  Performed by: Lacey Grant CRNA     US non-ob transvaginal    Result Date: 7/30/2024  PELVIC ULTRASOUND HISTORY: Pelvic pain. PROCEDURE: Ultrasound images of the pelvis were obtained. FINDINGS: The uterus measures 8 x 5 cm. The endometrium is  normal at 3 mm. The right ovary demonstrates a 12 cm cystic mass. Mural complex  lesions are noted. The left ovary  is not identified.  There is no significant free fluid .    Large right adnexal mass. This is " likely secondary to ovarian neoplasm. Images reviewed, interpreted, and dictated by JIM Elder MD    CT Abdomen Pelvis With Contrast    Result Date: 7/30/2024  CT SCAN OF THE ABDOMEN AND PELVIS WITH CONTRAST    7/30/2024 12:31 PM HISTORY: Acute lower abdominal pain and tenderness. COMPARISON: None. PROCEDURE: The patient was injected with IV contrast. Axial images were obtained from the lung bases to the pubic symphysis by computed tomography. This study was performed with techniques to keep radiation doses as low as reasonably achievable, (ALARA). Individualized dose reduction techniques using automated exposure control or adjustment of mA and/or kV according to the patient size were employed. FINDINGS: ABDOMEN: The lung bases are clear. The heart is proper size. There is a small hiatal hernia. The liver is homogenous with no focal abnormality. There are gallstones within the gallbladder. The spleen is unremarkable. No adrenal mass is present.  The pancreas is unremarkable. The kidneys demonstrate parapelvic cysts bilaterally. There is a right renal stone measuring 5 mm. There are multiple left renal stones measuring up to 20 mm. The aorta is proper caliber. There is no free fluid or adenopathy. PELVIS: There is sigmoid diverticulosis throughout the transverse colon. The GI tract demonstrates no obstruction.  The appendix is not identified. The urinary bladder is unremarkable. There is a cystic mass measuring up to 16 cm with multiple mural complex cysts measuring up to 4 cm. The uterus is deviated to the left.    Bilateral nephrolithiasis. Large cystic mass in the pelvis which extends into the abdomen. This likely represents ovarian neoplasm. Images reviewed, interpreted, and dictated by Dr. JIM Elder. Transcribed by Patricia Montenegro PA-C.                 Plan for Follow-up of Pending Labs/Results:   Pending Labs       Order Current Status    NON-GYN CYTOLOGY, [facility]  (MELISSA,COR,MAD,RICH) In process          Discharge Details        Discharge Medications        Continue These Medications        Instructions Start Date   aspirin 81 MG chewable tablet   81 mg, Oral, Daily      lisinopril 10 MG tablet  Commonly known as: PRINIVIL,ZESTRIL   10 mg, Oral, Daily      Multi-Vitamin/Fluoride/Iron 0.25-10 MG/ML solution   1 tablet, Oral, Daily      rosuvastatin 20 MG tablet  Commonly known as: CRESTOR   20 mg, Oral, Daily      Vitamin D (Cholecalciferol) 10 MCG (400 UNIT) tablet  Commonly known as: CHOLECALCIFEROL   500 Units, Oral, Daily               No Known Allergies      Discharge Disposition:  Home or Self Care    Diet:  Hospital:  Diet Order   Procedures    Diet: Regular/House; Fluid Consistency: Thin (IDDSI 0)            Activity:      Restrictions or Other Recommendations:         CODE STATUS:    Code Status and Medical Interventions: CPR (Attempt to Resuscitate); Full Support   Ordered at: 07/31/24 2295     Code Status (Patient has no pulse and is not breathing):    CPR (Attempt to Resuscitate)     Medical Interventions (Patient has pulse or is breathing):    Full Support       No future appointments.    Additional Instructions for the Follow-ups that You Need to Schedule       Discharge Follow-up with PCP   As directed       Currently Documented PCP:    Farrah Pandey DO    PCP Phone Number:    [phone]     Follow Up Details: follow up with PCP in one week        Discharge Follow-up with Specialty: follow up with Gyn Onc Dr Abarca in 1-2 weeks   As directed      Specialty: follow up with Gyn Onc Dr Abarca in 1-2 weeks                      Ilya Carreno MD  08/03/24      Time Spent on Discharge:  I spent  35  minutes on this discharge activity which included: face-to-face encounter with the patient, reviewing the data in the system, coordination of the care with the nursing staff as well as consultants, documentation, and entering orders.             Electronically signed by Ilya Carreno MD at 08/03/24 5149

## 2024-08-05 NOTE — TELEPHONE ENCOUNTER
Called to let pt know of results per Dr. Bautista    Pt verbalized an understanding    Also transferred pt to  to get post op appt. scheduled

## 2024-08-05 NOTE — TELEPHONE ENCOUNTER
----- Message from Chivo Bautista sent at 8/5/2024 10:35 AM EDT -----  Please give the patient the following message:  Your tests have been reviewed. No changes are needed to our current plan.  Final path benign.  Will discuss further at post op  ----- Message -----  From: Lab, Background User  Sent: 8/2/2024   2:58 PM EDT  To: Chivo Bautista MD

## 2024-08-08 ENCOUNTER — READMISSION MANAGEMENT (OUTPATIENT)
Dept: CALL CENTER | Facility: HOSPITAL | Age: 69
End: 2024-08-08
Payer: MEDICARE

## 2024-08-08 NOTE — OUTREACH NOTE
Medical Week 1 Survey      Flowsheet Row Responses   [facility] patient discharged from? [place]   Does the patient have one of the following disease processes/diagnoses(primary or secondary)? Other   Week 1 attempt successful? Yes   Call start time 1451   Call end time 1454   Discharge diagnosis *Adnexal massLarge pelvic mass   Meds reviewed with patient/caregiver? Yes   Is the patient having any side effects they believe may be caused by any medication additions or changes? No   Does the patient have all medications ordered at discharge? N/A   Prescription comments no medication changes   Is the patient taking all medications as directed (includes completed medication regime)? Yes   Comments regarding appointments Surgical f/u next week   Does the patient have a primary care provider?  Yes   Does the patient have an appointment with their PCP within 7 days of discharge? Yes  [PCP on 8/7/24]   Has the patient kept scheduled appointments due by today? Yes   Has home health visited the patient within 72 hours of discharge? N/A   Psychosocial issues? No   Did the patient receive a copy of their discharge instructions? Yes   Nursing interventions Reviewed instructions with patient   What is the patient's perception of their health status since discharge? Improving  [Pt reports she is doing well and is not having any pain issues.  Reports no issues with incisions,  reviewed site and activity precautions with pt. She has no questions or concerns today.]   Is the patient/caregiver able to teach back signs and symptoms related to disease process for when to call PCP? Yes   Is the patient/caregiver able to teach back signs and symptoms related to disease process for when to call 911? Yes   Week 1 call completed? Yes   Graduated Yes  [no immediate needs or concerns, apps in place]   Call end time 1454            DEMARCUS BEAUCHAMP - Registered Nurse

## 2024-08-15 ENCOUNTER — OFFICE VISIT (OUTPATIENT)
Dept: GYNECOLOGIC ONCOLOGY | Facility: CLINIC | Age: 69
End: 2024-08-15
Payer: MEDICARE

## 2024-08-15 VITALS
RESPIRATION RATE: 18 BRPM | BODY MASS INDEX: 24.03 KG/M2 | SYSTOLIC BLOOD PRESSURE: 124 MMHG | HEIGHT: 63 IN | WEIGHT: 135.6 LBS | DIASTOLIC BLOOD PRESSURE: 79 MMHG | HEART RATE: 78 BPM | TEMPERATURE: 98 F | OXYGEN SATURATION: 99 %

## 2024-08-15 DIAGNOSIS — N83.202 CYST OF LEFT OVARY: Primary | ICD-10-CM

## 2024-08-15 PROCEDURE — 99024 POSTOP FOLLOW-UP VISIT: CPT | Performed by: OBSTETRICS & GYNECOLOGY

## 2024-08-15 NOTE — PROGRESS NOTES
Subjective:       Yolande Acosta presents to the clinic 2 weeks following open hysterectomy and mass resection. Eating a regular diet without difficulty. Bowel movements are Normal.  The patient is not having any pain..     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  Uterus right ovary tube no evidence of malignancy     Objective:      LMP  (LMP Unknown)     General:  alert, appears stated age, and cooperative   Abdomen: soft, bowel sounds active, non-tender   Incision:   healing well, no drainage, no erythema, no hernia, no seroma, no swelling, no dehiscence, incision well approximated       Assessment:      Doing well postoperatively.      Plan:      1. Continue any current medications.  2. Wound care discussed.  3. Pt is to increase activities as tolerated.  May drive starting today.  4. Follow up:  prn

## 2024-08-27 ENCOUNTER — TELEPHONE (OUTPATIENT)
Dept: GYNECOLOGIC ONCOLOGY | Facility: CLINIC | Age: 69
End: 2024-08-27
Payer: MEDICARE

## 2024-08-27 NOTE — TELEPHONE ENCOUNTER
Caller: Yolande Acosta    Relationship: Self    Best call back number: [phone]    What is the best time to reach you: ANYTIME    Who are you requesting to speak with (clinical staff, provider,  specific staff member): CLINICAL    What was the call regarding: PT IS REQUESTING A C/B IN REGARDS TO FORMS FROM THE [employer] THAT NEEDS TO BE FILLED OUT DUE TO HER RECENT SURGERY PT HAD TO CANCEL HER TRIP, [employer] IS REQUESTING DOCUMENTATION FOR A REFUND  PLEASE ADVISE

## 2024-08-27 NOTE — TELEPHONE ENCOUNTER
Patient called requesting a letter regarding as she had to cancel an airline ticket due to her surgery.  Patient emailed RN a form.  Form was completed and emailed to patient.  Appreciative of help to recover ticket cost.  No  concerns at this time.

## 2025-03-03 ENCOUNTER — TRANSCRIBE ORDERS (OUTPATIENT)
Dept: ADMINISTRATIVE | Facility: HOSPITAL | Age: 70
End: 2025-03-03
Payer: MEDICARE

## 2025-03-03 DIAGNOSIS — Z12.31 SCREENING MAMMOGRAM FOR BREAST CANCER: Primary | ICD-10-CM

## 2025-03-23 LAB
NCCN CRITERIA FLAG: NORMAL
TYRER CUZICK SCORE: 11.5

## 2025-04-07 ENCOUNTER — HOSPITAL ENCOUNTER (OUTPATIENT)
Dept: MAMMOGRAPHY | Facility: HOSPITAL | Age: 70
Discharge: HOME | End: 2025-04-07
Admitting: INTERNAL MEDICINE
Payer: MEDICARE

## 2025-04-07 DIAGNOSIS — Z12.31 SCREENING MAMMOGRAM FOR BREAST CANCER: ICD-10-CM

## 2025-04-07 PROCEDURE — 77067 SCR MAMMO BI INCL CAD: CPT

## 2025-04-07 PROCEDURE — 77063 BREAST TOMOSYNTHESIS BI: CPT

## 2025-04-07 PROCEDURE — 77063 BREAST TOMOSYNTHESIS BI: CPT | Performed by: RADIOLOGY

## 2025-04-07 PROCEDURE — 77067 SCR MAMMO BI INCL CAD: CPT | Performed by: RADIOLOGY

## (undated) DEVICE — PENCL SMOKE/EVAC MEGADYNE TELESCP 10FT

## (undated) DEVICE — PATIENT RETURN ELECTRODE, SINGLE-USE, CONTACT QUALITY MONITORING, ADULT, WITH 9FT CORD, FOR PATIENTS WEIGING OVER 33LBS. (15KG): Brand: MEGADYNE

## (undated) DEVICE — GLV SURG SENSICARE PI MIC PF SZ6 LF STRL

## (undated) DEVICE — SUT VIC 12X27 D8116 BX/12

## (undated) DEVICE — SUT PDS LP 1 TP1 96IN VIO PDP880GA

## (undated) DEVICE — ANTIBACTERIAL UNDYED BRAIDED (POLYGLACTIN 910), SYNTHETIC ABSORBABLE SUTURE: Brand: COATED VICRYL

## (undated) DEVICE — PREMIUM DRY TRAY LF: Brand: MEDLINE INDUSTRIES, INC.

## (undated) DEVICE — LEX BASIC NO DRAPE: Brand: MEDLINE INDUSTRIES, INC.

## (undated) DEVICE — 3M™ WARMING BLANKET, UPPER BODY, 10 PER CASE, 42268: Brand: BAIR HUGGER™

## (undated) DEVICE — DRAPE, LAVH, STERILE: Brand: MEDLINE

## (undated) DEVICE — SCRB SURG BACTOSHIELD CHG 4PCT 4OZ

## (undated) DEVICE — PCH INST SURG INVISISHIELD 2PCKT

## (undated) DEVICE — TRAP FLD MINIVAC MEGADYNE 100ML

## (undated) DEVICE — SUT MONOCRYL PLS ANTIB UND 3/0  PS1 27IN

## (undated) DEVICE — UNDERGLV SURG BIOGEL INDICAT PF 61/2 GRN

## (undated) DEVICE — SUT VICRYL PLS CTD ANTIB CR8 CT1 SZ0 27IN BR/VIL VCPP31D

## (undated) DEVICE — DRAPE,UNDERBUTTOCKS,STERILE: Brand: MEDLINE